# Patient Record
Sex: FEMALE | Race: WHITE | NOT HISPANIC OR LATINO | URBAN - METROPOLITAN AREA
[De-identification: names, ages, dates, MRNs, and addresses within clinical notes are randomized per-mention and may not be internally consistent; named-entity substitution may affect disease eponyms.]

---

## 2017-01-24 ENCOUNTER — GENERIC CONVERSION - ENCOUNTER (OUTPATIENT)
Dept: OTHER | Facility: OTHER | Age: 57
End: 2017-01-24

## 2017-01-24 DIAGNOSIS — E03.9 HYPOTHYROIDISM: ICD-10-CM

## 2018-01-22 VITALS
HEIGHT: 65 IN | BODY MASS INDEX: 48.48 KG/M2 | SYSTOLIC BLOOD PRESSURE: 118 MMHG | DIASTOLIC BLOOD PRESSURE: 72 MMHG | WEIGHT: 291 LBS

## 2023-05-30 ENCOUNTER — NURSING HOME VISIT (OUTPATIENT)
Dept: WOUND CARE | Facility: HOSPITAL | Age: 63
End: 2023-05-30

## 2023-05-30 DIAGNOSIS — Z87.19 HX OF VENTRAL HERNIA REPAIR: Primary | ICD-10-CM

## 2023-05-30 DIAGNOSIS — Z98.890 HX OF VENTRAL HERNIA REPAIR: Primary | ICD-10-CM

## 2023-05-30 NOTE — PROGRESS NOTES
[address] [facility]NAG[facility]   [facility]       Patient ID: Saurabh Villela is a 59 y o  female Date of Birth 1960     Location of Service: [address] [place] [address] 100 rehab    Performed wound round with: Wound team     Chief Complaint : Right abdomen    Wound Instructions:  Wound: Right abdomen wound  Discontinue current wound orders  Cleanse the wound bed with Dakin's quarter strength  Apply Skin-Prep to periwound area  Apply silver alginate to wound bed and cover with ABD pad  Daily and as needed for soiling  Continue to offload  Monitor for increased drainage and sign of infection, informed primary care in your facility    Allergies  Patient has no allergy information on record  Assessment & Plan:  1  Hx of ventral hernia repair  Assessment & Plan:  Recently admitted for abscess on the abdomen  The wound VAC on the mid abdomen is intact, was recently replaced yesterday  The stab wounds on the right abdomen is positive of malodor, with moderate amount of discharge  Change wound treatment to cleanse with Dakin's quarter strength and silver alginate  Continue to follow-up with surgeon  Follow-up in 2 weeks             Subjective:   May 20, 2023  New consult for wound on the abdomen  Patient was referred by Dr Brice Zimmer  Patient currently admitted at [address] [place] [address],[address] rehab for short-term rehab  As per review of Dr Rosendo Lundborg note on May 16, 2023, patient was recently admitted at [facility] for abscess from V1 to May 11, 2023  Patient have a history of perforated diverticulitis status post Molina's procedure in 2005, colostomy takedown and repair (stomal hernia was done on December 2005, incarcerated virginal hernia with repair with mesh and partial omentectomy on August 2011  She have a recurrent ventral hernia causing partial small bowel obstruction status post ex lap, JEANNINE, SBR , possible closure with bridging biologic mesh on March 2023    The recent CT scan showed abscess within the midline anterior abdomen that communicated with the ventral abdominal wound  Received patient in bed, seems comfortable  Wound VAC on the midline abdomen skin is intact  Patient denies pain  As per patient, she have a follow-up appointment with her surgeon next week  Review of Systems   Constitutional: Negative  Respiratory: Negative  Cardiovascular: Negative  Skin: Positive for wound  Psychiatric/Behavioral: Negative  Objective:    Physical Exam  Constitutional:       Appearance: She is obese  Cardiovascular:      Rate and Rhythm: Normal rate  Pulmonary:      Effort: Pulmonary effort is normal    Musculoskeletal:      Comments: LROM   Skin:     Findings: Lesion present  Comments: Right abdomen: Multiple wounds  From proximal to distal  1  Wound size is 0 3 x 0 5 x 0 1 cm ,  100% granulation, with small amount of serous drainage, positive malodor, no redness  2  Wound size is 0 6 x 1 x 0 1 cm ,  100% granulation, with small amount of serous drainage, positive malodor, no redness  3  Wound size is 0 5 x 1 x 0 1 cm ,  100% granulation, with small amount of serous drainage, positive malodor, no redness  4  Wound size is 1 x 0 8 x 0 1 cm ,  100% granulation, with moderate amount of tan drainage, positive malodor, no redness  5  Wound size is 1 5 x 3 x 0 1 cm ,  100% granulation, with moderate amount of tannish drainage, positive malodor, no redness  6  Wound size is 7 x 4 x 0 1 cm ,  50% granulation, 50% slough, with moderate amount of serous drainage, positive malodor, no redness   Neurological:      Mental Status: She is alert  Procedures           Patient's care was coordinated with nursing facility staff  Recent vitals, labs and updated medications were reviewed on EMR or chart system of facility   Past Medical, surgical, social, medication and allergy history and patient's previous records were reviewed and updated as appropriate: Most up-to date information "is available in the facility EMR where the patient is currently admitted  Allergy List:    Allergy Reaction Adverse Event Date  NKDA Unknown    Past Medical History:  Arthritis  Back pain  Chronic pain  Constipation  Depression  Thyroid disease in the past  Edema  History of hernia repair  Heat stroke  Hyperlipidemia  Hypertension  Kidney stone  Low back pain  Miscarriage  Obesity  Osteoarthritis  Perforated diverticulitis  Small bowel obstruction  Right thumb trigger finger  And unintentional weight loss    Past Surgical History:  Abdominal surgery for diverticulitis with colon resection and colostomy 2005 abscess drainage 2005 appendectomy 2005     Colonoscopy   Colostomy reversal   Dilation and curettage of uterus   Left tibia ORIF 2020  Laparotomy 2023  Laparotomy 2023  Sigmoidectomy   Ventral hernia repair 2011    Social History: Former smoker does not drink alcohol    Family History: Mother with diabetes, miscarriages, thyroid disorder ordered  Father with alcohol abuse and dementia  Maternal grandmother with arthritis and cancer  Sister with seizures    Immunization List:  Immunization ([address] Code) Date Given Consent Status  TB 2 Step Mantoux Skin Test (98) 2023 2:40:00 PM Consented  TB 2 Step Mantoux Skin Test (98) 2023 6:10:00 PM Consented  Influenza (141) 2022 Historical  Tdap (Tetanus, Diphtheria, Pertussis) (115) 2016 Historical       Coordination of Care: Wound team aware of the treatment plan  Facility nurse will provide wound treatment and monitor the wound for any changes  Patient / Staff education : Patient / Staff was given education on sign of infection and pressure ulcer prevention  Patient/ Staff verbalized understanding     Follow up : Two weeks    Voice-recognition software may have been used in the preparation of this document   Occasional wrong word or \"sound-alike\" substitutions may " have occurred due to the inherent limitations of voice recognition software  Interpretation should be guided by context        BAILEE Patino

## 2023-05-30 NOTE — ASSESSMENT & PLAN NOTE
Recently admitted for abscess on the abdomen  The wound VAC on the mid abdomen is intact, was recently replaced yesterday    The stab wounds on the right abdomen is positive of malodor, with moderate amount of discharge  Change wound treatment to cleanse with Dakin's quarter strength and silver alginate  Continue to follow-up with surgeon  Follow-up in 2 weeks

## 2023-06-13 ENCOUNTER — NURSING HOME VISIT (OUTPATIENT)
Dept: WOUND CARE | Facility: HOSPITAL | Age: 63
End: 2023-06-13

## 2023-06-13 DIAGNOSIS — Z87.19 HX OF VENTRAL HERNIA REPAIR: Primary | ICD-10-CM

## 2023-06-13 DIAGNOSIS — Z98.890 HX OF VENTRAL HERNIA REPAIR: Primary | ICD-10-CM

## 2023-06-13 NOTE — ASSESSMENT & PLAN NOTE
The wound on the left abdomen, which is currently on wound VAC, had moderate amount of bloody discharge  I ordered to hold the wound VAC and inform the surgeon  Patient have appointment with surgeon on Thursday  In the meantime, can use oral emulsion on an silver alginate for wound dressing  The wound on the right abdomen, the most superior wound, had moderate amount of purulent discharge  The rest of the wound had no obvious sign of infection  Can use silver alginate for wound dressing      Continue to follow-up with surgeon  Patient will be included in the weekly wound and will monitor for any worsening of the wound

## 2023-06-13 NOTE — PROGRESS NOTES
[address] [facility]   [facility]       Patient ID: Mayur Amaya is a 59 y o  female Date of Birth 1960     Location of Service: [address] [place] [address] 100 rehab    Performed wound round with: Wound team     Chief Complaint : Right abdomen    Wound Instructions:  Wound: Right abdomen wound except for wound covered with eschar  Discontinue current wound orders  Cleanse the wound bed with Dakin's quarter strength  Apply Skin-Prep to periwound area  Apply silver alginate to wound bed and cover with ABD pad  Daily and as needed for soiling    Wound: Left abdomen  Hold the wound VAC  Informed the surgeon  Cleanse the wound bed with Dakin's quarter strength  Apply Skin-Prep to periwound area  Apply oil emulsion to wound bed and cover with silver alginate and bordered foam or ABD pad  Daily and as needed for soiling    Continue to offload  Monitor for increased drainage and sign of infection, informed primary care in your facility    Allergies  Patient has no allergy information on record  Assessment & Plan:  1  Hx of ventral hernia repair  Assessment & Plan: The wound on the left abdomen, which is currently on wound VAC, had moderate amount of bloody discharge  I ordered to hold the wound VAC and inform the surgeon  Patient have appointment with surgeon on Thursday  In the meantime, can use oral emulsion on an silver alginate for wound dressing  The wound on the right abdomen, the most superior wound, had moderate amount of purulent discharge  The rest of the wound had no obvious sign of infection  Can use silver alginate for wound dressing  Continue to follow-up with surgeon  Patient will be included in the weekly wound and will monitor for any worsening of the wound             Subjective:   May 20, 2023  New consult for wound on the abdomen  Patient was referred by Dr Charlie Castro  Patient currently admitted at [address] [place] [address],[facility] 100 rehab for short-term rehab    As per review of Dr Joe Olmos Moustapha note on May 16, 2023, patient was recently admitted at [facility] for abscess from V1 to May 11, 2023  Patient have a history of perforated diverticulitis status post Molina's procedure in 2005, colostomy takedown and repair (stomal hernia was done on December 2005, incarcerated virginal hernia with repair with mesh and partial omentectomy on August 2011  She have a recurrent ventral hernia causing partial small bowel obstruction status post ex lap, JEANNINE, SBR , possible closure with bridging biologic mesh on March 2023  The recent CT scan showed abscess within the midline anterior abdomen that communicated with the ventral abdominal wound  Received patient in bed, seems comfortable  Wound VAC on the midline abdomen skin is intact  Patient denies pain  As per patient, she have a follow-up appointment with her surgeon next week  June 13, 2023  Follow-up for wound on the abdomen  As per report, patient was recently admitted in acute care for infection of the wound on the abdomen  As per report, patient completed the antibiotic  Received patient in bed, seems comfortable  Denies pain  Denies fever  I checked the medical record, there is no recent laboratory  Review of Systems   Constitutional: Negative  Respiratory: Negative  Cardiovascular: Negative  Skin: Positive for wound  Psychiatric/Behavioral: Negative  Objective:    Physical Exam  Constitutional:       Appearance: She is obese  Cardiovascular:      Rate and Rhythm: Normal rate  Pulmonary:      Effort: Pulmonary effort is normal    Musculoskeletal:      Comments: LROM   Skin:     Findings: Lesion present        Comments: Left abdomen: Wound size is 8 x 17 x 1 cm ,  100% granulation, with moderate amount of blood, periwound is normal, positive tenderness on palpation, with no obvious sign of infection    Buttocks: Wound size is 5 x 0 5 x 0 1 cm ,  100% epithelial, periwound is normal, with no obvious sign of infection    Wound on the left abdomen, from superior to inferior  1  Wound bed is 5 x 6 x 0 1 cm ,  100% granulation, with purulent discharge, periwound is normal, no malodor  2  With ostomy  3  With the ostomy  4  Wound bed is 2 x 3 x 0 1 cm ,  100% granulation, small amount of serous drainage, with no obvious sign of infection  5  Wound is resolved  6  Wound bed is 2 x 0 5 cm ,  100% eschar, no drainage, no obvious sign of infection   Neurological:      Mental Status: She is alert  Procedures             Patient's care was coordinated with nursing facility staff  Recent vitals, labs and updated medications were reviewed on EMR or chart system of facility  Past Medical, surgical, social, medication and allergy history and patient's previous records were reviewed and updated as appropriate: Most up-to date information is available in the facility EMR where the patient is currently admitted  Allergy List:    Allergy Reaction Adverse Event Date  NKDA Unknown    Past Medical History:  Arthritis  Back pain  Chronic pain  Constipation  Depression  Thyroid disease in the past  Edema  History of hernia repair  Heat stroke  Hyperlipidemia  Hypertension  Kidney stone  Low back pain  Miscarriage  Obesity  Osteoarthritis  Perforated diverticulitis  Small bowel obstruction  Right thumb trigger finger  And unintentional weight loss    Past Surgical History:  Abdominal surgery for diverticulitis with colon resection and colostomy 2005 abscess drainage 2005 appendectomy 2005     Colonoscopy   Colostomy reversal   Dilation and curettage of uterus   Left tibia ORIF 2020  Laparotomy 2023  Laparotomy 2023  Sigmoidectomy   Ventral hernia repair 2011    Social History: Former smoker does not drink alcohol    Family History:   Mother with diabetes, miscarriages, thyroid disorder ordered  Father with alcohol abuse and "dementia  Maternal grandmother with arthritis and cancer  Sister with seizures    Immunization List:  Immunization ([address] Code) Date Given Consent Status  TB 2 Step Mantoux Skin Test (98) 05/13/2023 2:40:00 PM Consented  TB 2 Step Mantoux Skin Test (98) 04/27/2023 6:10:00 PM Consented  Influenza (141) 11/08/2022 Historical  Tdap (Tetanus, Diphtheria, Pertussis) (115) 06/06/2016 Historical       Coordination of Care: Wound team aware of the treatment plan  Facility nurse will provide wound treatment and monitor the wound for any changes  Patient / Staff education : Patient / Staff was given education on sign of infection and pressure ulcer prevention  Patient/ Staff verbalized understanding     I have spent a total time of 20 minutes on 06/13/23 in caring for this patient including Risks and benefits of tx options, Instructions for management, Patient and family education, Importance of tx compliance, Risk factor reductions, Impressions, Counseling / Coordination of care, Documenting in the medical record, Obtaining or reviewing history   and Communicating with other healthcare professionals   Follow up :  Next week    Voice-recognition software may have been used in the preparation of this document  Occasional wrong word or \"sound-alike\" substitutions may have occurred due to the inherent limitations of voice recognition software  Interpretation should be guided by context        BAILEE Schaeffer  "

## 2023-07-11 ENCOUNTER — NURSING HOME VISIT (OUTPATIENT)
Dept: WOUND CARE | Facility: HOSPITAL | Age: 63
End: 2023-07-11
Payer: MEDICARE

## 2023-07-11 DIAGNOSIS — Z98.890 HX OF VENTRAL HERNIA REPAIR: Primary | ICD-10-CM

## 2023-07-11 DIAGNOSIS — Z87.19 HX OF VENTRAL HERNIA REPAIR: Primary | ICD-10-CM

## 2023-07-11 PROCEDURE — 99309 SBSQ NF CARE MODERATE MDM 30: CPT | Performed by: NURSE PRACTITIONER

## 2023-07-12 NOTE — PROGRESS NOTES
[facility]   AND HYPERBARIC MEDICINE [place]       Patient ID: Tamica Toussaint is a 59 y.o. female Date of Birth 1960     Location of Service: [address] [place], [place] [address]    Performed wound round with: Wound team     Chief Complaint : Right and left abdomen     Wound Instructions:  Wound: Right abdomen and left abdomen  Cleanse with normal saline solution or wound cleanser  Apply Skin-Prep to periwound area  Apply calcium alginate to wound bed and cover with ABD pad or bordered foam.  For the wound on the right abdomen, apply oil emulsion prior to application of calcium alginate. 3 times a day and as needed for soiling  Continue to offload  Monitor for increased drainage and sign of infection, informed primary care in your facility    Allergies  Patient has no allergy information on record. Assessment & Plan:  1. Hx of ventral hernia repair  Assessment & Plan:  Patient had appointment with surgeon today, and the surgery discontinue the wound VAC on the abdomen. The wound on the left abdomen is stable, 100% granulation  The patient had 2 active fistula on the right abdomen with brownish discharge. As per report, the ostomy, fistula appliance is not working, causing excoriation on. Patient also complained that she is not comfortable and she is on pain every time they are using the appliance. Local wound care with calcium alginate 3 times a day  Continue to monitor for worsening  This wound probably will not heal, the goal of care is conservative and management of drainage. As per surgeon, patient is not a candidate for surgery. Subjective:   May 20, 2023. New consult for wound on the abdomen. Patient was referred by Dr. Fuentes Coughlin. Patient currently admitted at [address] [place] [address] for short-term rehab. As per review of Dr. Leland Toro note on May 16, 2023, patient was recently admitted at [facility] for abscess from V1 to May 11, 2023.   Patient have a history of perforated diverticulitis status post Molina's procedure in 2005, colostomy takedown and repair (stomal hernia was done on December 2005, incarcerated virginal hernia with repair with mesh and partial omentectomy on August 2011. She have a recurrent ventral hernia causing partial small bowel obstruction status post ex lap, JEANNINE, SBR , possible closure with bridging biologic mesh on March 2023. The recent CT scan showed abscess within the midline anterior abdomen that communicated with the ventral abdominal wound. Received patient in bed, seems comfortable. Wound VAC on the midline abdomen skin is intact. Patient denies pain. As per patient, she have a follow-up appointment with her surgeon next week. June 13, 2023. Follow-up for wound on the abdomen. As per report, patient was recently admitted in acute care for infection of the wound on the abdomen. As per report, patient completed the antibiotic. Received patient in bed, seems comfortable. Denies pain. Denies fever. I checked the medical record, there is no recent laboratory. 7/11, 2023. Follow-up for wound on the abdomen. As per report, patient have appointment with surgeon today. Current wound treatment on the abdomen is wound VAC and ostomy appliance on the fistula. Patient complaining of pain on the abdomen on palpation. Review of Systems   Constitutional: Negative. Respiratory: Negative. Cardiovascular: Negative. Skin: Positive for wound. Psychiatric/Behavioral: Negative. Objective:    Physical Exam  Constitutional:       Appearance: She is obese. Cardiovascular:      Rate and Rhythm: Normal rate. Pulmonary:      Effort: Pulmonary effort is normal.   Musculoskeletal:      Comments: LROM   Skin:     Findings: Lesion present. Comments: Abdomen left:  Wound size is 5 x 16 x 0.3 cm.,  100% granulation, large amount of serous drainage, periwound is normal, with no obvious sign of infection    Right abdomen assessment from proximal to distal  Abdomen 1: Wound size is 6.5 x 3 x 0.1 cm.,  100% hypergranulation, large amount of serous drainage, periwound is normal, with no obvious sign of infection    Abdomen 2: Wound size is 0.5x1, depth none measurable, positive active fistula, draining brownish drainage, with no obvious sign of infection    Abdomen 3: Wound size is 0.3 x 0.3 cm.,  Depth not measurable, positive active fistula, draining brownish drainage, with no obvious sign of infection    Abdomen 4: Wound size is 0.6 x 1 cm.,  100% granulation, small amount of serous drainage, with no obvious sign of infection    Abdomen 5: Wound is resolved    Abdomen 6: Wound size is 2 x 3.5 cm.,  100% granulation, small amount of serous drainage, with no obvious sign of infection   Neurological:      Mental Status: She is alert. Procedures           Patient's care was coordinated with nursing facility staff. Recent vitals, labs and updated medications were reviewed on EMR or chart system of facility. Past Medical, surgical, social, medication and allergy history and patient's previous records were reviewed and updated as appropriate: Most up-to date information is available in the facility EMR where the patient is currently admitted. Allergy List:  .   Allergy Reaction Adverse Event Date  NKDA Unknown    Past Medical History:  Arthritis  Back pain  Chronic pain  Constipation  Depression  Thyroid disease in the past  Edema  History of hernia repair  Heat stroke  Hyperlipidemia  Hypertension  Kidney stone  Low back pain  Miscarriage  Obesity  Osteoarthritis  Perforated diverticulitis  Small bowel obstruction  Right thumb trigger finger  And unintentional weight loss    Past Surgical History:  Abdominal surgery for diverticulitis with colon resection and colostomy 2005 abscess drainage 2005 appendectomy 2005     Colonoscopy   Colostomy reversal   Dilation and curettage of uterus 1993  Left tibia ORIF July 2020  Laparotomy March 14, 2023  Laparotomy March 18, 2023  Sigmoidectomy 2005  Ventral hernia repair August 2011    Social History: Former smoker does not drink alcohol    Family History: Mother with diabetes, miscarriages, thyroid disorder ordered  Father with alcohol abuse and dementia  Maternal grandmother with arthritis and cancer  Sister with seizures    Immunization List:  Immunization ([address] Code) Date Given Consent Status  TB 2 Step Mantoux Skin Test (98) 05/13/2023 2:40:00 PM Consented  TB 2 Step Mantoux Skin Test (98) 04/27/2023 6:10:00 PM Consented  Influenza (141) 11/08/2022 Historical  Tdap (Tetanus, Diphtheria, Pertussis) (115) 06/06/2016 Historical       Coordination of Care: Wound team aware of the treatment plan. Facility nurse will provide wound treatment and monitor the wound for any changes. Patient / Staff education : Patient / Staff was given education on sign of infection and pressure ulcer prevention. Patient/ Staff verbalized understanding     I have spent a total time of 20 minutes on 7/11/2023 in caring for this patient including Risks and benefits of tx options, Instructions for management, Patient and family education, Importance of tx compliance, Risk factor reductions, Impressions, Counseling / Coordination of care, Documenting in the medical record, Obtaining or reviewing history   and Communicating with other healthcare professionals . Follow up :  Next week    Voice-recognition software may have been used in the preparation of this document. Occasional wrong word or "sound-alike" substitutions may have occurred due to the inherent limitations of voice recognition software. Interpretation should be guided by context.       BAILEE Milan

## 2023-07-12 NOTE — ASSESSMENT & PLAN NOTE
Patient had appointment with surgeon today, and the surgery discontinue the wound VAC on the abdomen. The wound on the left abdomen is stable, 100% granulation  The patient had 2 active fistula on the right abdomen with brownish discharge. As per report, the ostomy, fistula appliance is not working, causing excoriation on. Patient also complained that she is not comfortable and she is on pain every time they are using the appliance. Local wound care with calcium alginate 3 times a day  Continue to monitor for worsening  This wound probably will not heal, the goal of care is conservative and management of drainage. As per surgeon, patient is not a candidate for surgery.

## 2023-07-18 ENCOUNTER — NURSING HOME VISIT (OUTPATIENT)
Dept: WOUND CARE | Facility: HOSPITAL | Age: 63
End: 2023-07-18
Payer: MEDICARE

## 2023-07-18 DIAGNOSIS — Z87.19 HX OF VENTRAL HERNIA REPAIR: Primary | ICD-10-CM

## 2023-07-18 DIAGNOSIS — L98.8 FISTULA: ICD-10-CM

## 2023-07-18 DIAGNOSIS — Z98.890 HX OF VENTRAL HERNIA REPAIR: Primary | ICD-10-CM

## 2023-07-18 PROCEDURE — 99309 SBSQ NF CARE MODERATE MDM 30: CPT | Performed by: NURSE PRACTITIONER

## 2023-07-18 NOTE — ASSESSMENT & PLAN NOTE
Patient had 2 open fistula on the right abdomen with positive effluent, brown, moderate amount  I tried to use new ostomy appliance, will monitor

## 2023-07-18 NOTE — PROGRESS NOTES
[facility]   [facility] [place]       Patient ID: Lord Keating is a 59 y.o. female Date of Birth 1960     Location of Service: [address] [place], [place] [address]    Performed wound round with: Wound team     Chief Complaint : Right and left abdomen     Wound Instructions:  Wound: Right abdomen and left abdomen  Cleanse with normal saline solution or wound cleanser  Apply Skin-Prep to periwound area  Apply calcium alginate to wound bed and cover with ABD pad or bordered foam.  For the wound on the right abdomen, apply oil emulsion prior to application of calcium alginate. 3 times a day and as needed for soiling    Apply ostomy appliance to the fistula on the right abdomen    Continue to offload  Monitor for increased drainage and sign of infection, informed primary care in your facility    Allergies  Patient has no allergy information on record. Assessment & Plan:  1. Hx of ventral hernia repair  Assessment & Plan:  The surgical incision on the mid abdomen improved, decrease in wound size, and increased granulation. The 2 fistula on the right abdomen is still draining a lot. Calcium alginate is not working. I tried a new ostomy appliance. The other wound on the right abdomen is improving, increased granulation  Follow-up next week      2. Fistula  Assessment & Plan:  Patient had 2 open fistula on the right abdomen with positive effluent, brown, moderate amount  I tried to use new ostomy appliance, will monitor             Subjective:   May 20, 2023. New consult for wound on the abdomen. Patient was referred by Dr. Quiana Yoder. Patient currently admitted at [address] [place] [address] for short-term rehab. As per review of Dr. Trinh Don note on May 16, 2023, patient was recently admitted at [facility] for abscess from V1 to May 11, 2023.   Patient have a history of perforated diverticulitis status post Molina's procedure in 2005, colostomy takedown and repair (stomal hernia was done on December 2005, incarcerated virginal hernia with repair with mesh and partial omentectomy on August 2011. She have a recurrent ventral hernia causing partial small bowel obstruction status post ex lap, JEANNINE, SBR , possible closure with bridging biologic mesh on March 2023. The recent CT scan showed abscess within the midline anterior abdomen that communicated with the ventral abdominal wound. Received patient in bed, seems comfortable. Wound VAC on the midline abdomen skin is intact. Patient denies pain. As per patient, she have a follow-up appointment with her surgeon next week. June 13, 2023. Follow-up for wound on the abdomen. As per report, patient was recently admitted in acute care for infection of the wound on the abdomen. As per report, patient completed the antibiotic. Received patient in bed, seems comfortable. Denies pain. Denies fever. I checked the medical record, there is no recent laboratory. 7/11, 2023. Follow-up for wound on the abdomen. As per report, patient have appointment with surgeon today. Current wound treatment on the abdomen is wound VAC and ostomy appliance on the fistula. Patient complaining of pain on the abdomen on palpation. July 18, 2023. Follow-up for wound on the abdomen. Is a patient in bed, seems comfortable. As per report, patient in pain refusing calcium alginate on the right abdomen and was only requesting for a dry sterile dressing. Fistula is still draining a lot. Review of Systems   Constitutional: Negative. Respiratory: Negative. Cardiovascular: Negative. Skin: Positive for wound. Psychiatric/Behavioral: Negative. Objective:    Physical Exam  Constitutional:       Appearance: She is obese. Cardiovascular:      Rate and Rhythm: Normal rate. Pulmonary:      Effort: Pulmonary effort is normal.   Musculoskeletal:      Comments: LROM   Skin:     Findings: Lesion present.       Comments: Abdomen: Surgical wound is 5 x 16 x 0.2 cm.,  100% granulation, moderate amount of serous drainage, periwound is normal, with no obvious sign of infection    Right abdomen  Wound #1: Wound size is 5.5 x 2.5 x 0.1 cm.,  100% granulation, moderate amount of serous drainage, with no obvious sign of infection    Wound #2: Wound size is 0.6 x 0.6 cm.,  Moderate stool drainage, with no obvious sign of infection    Wound #3: Wound size is 0.2 x 0.2 cm.,  Moderate stool drainage, with no obvious sign of infection    Wound #4: Wound size is 0.5 x 0.5 cm.,  100% epithelial, with no obvious sign of infection     wound #6: Wound size is 2 x 3 x 0.1 cm.,  100% granulation, small amount of serous drainage, with no obvious sign of infection, periwound is excoriated   Neurological:      Mental Status: She is alert. Procedures           Patient's care was coordinated with nursing facility staff. Recent vitals, labs and updated medications were reviewed on EMR or chart system of facility. Past Medical, surgical, social, medication and allergy history and patient's previous records were reviewed and updated as appropriate: Most up-to date information is available in the facility EMR where the patient is currently admitted. Allergy List:  .   Allergy Reaction Adverse Event Date  NKDA Unknown    Past Medical History:  Arthritis  Back pain  Chronic pain  Constipation  Depression  Thyroid disease in the past  Edema  History of hernia repair  Heat stroke  Hyperlipidemia  Hypertension  Kidney stone  Low back pain  Miscarriage  Obesity  Osteoarthritis  Perforated diverticulitis  Small bowel obstruction  Right thumb trigger finger  And unintentional weight loss    Past Surgical History:  Abdominal surgery for diverticulitis with colon resection and colostomy 2005 abscess drainage 2005 appendectomy 2005     Colonoscopy   Colostomy reversal   Dilation and curettage of uterus   Left tibia ORIF 2020  Laparotomy March 14, 2023  Laparotomy March 18, 2023  Sigmoidectomy 2005  Ventral hernia repair August 2011    Social History: Former smoker does not drink alcohol    Family History: Mother with diabetes, miscarriages, thyroid disorder ordered  Father with alcohol abuse and dementia  Maternal grandmother with arthritis and cancer  Sister with seizures    Immunization List:  Immunization ([address] Code) Date Given Consent Status  TB 2 Step Mantoux Skin Test (98) 05/13/2023 2:40:00 PM Consented  TB 2 Step Mantoux Skin Test (98) 04/27/2023 6:10:00 PM Consented  Influenza (141) 11/08/2022 Historical  Tdap (Tetanus, Diphtheria, Pertussis) (115) 06/06/2016 Historical       Coordination of Care: Wound team aware of the treatment plan. Facility nurse will provide wound treatment and monitor the wound for any changes. Patient / Staff education : Patient / Staff was given education on sign of infection and pressure ulcer prevention. Patient/ Staff verbalized understanding     I have spent a total time of 25 minutes on 7/11/2023 in caring for this patient including Risks and benefits of tx options, Instructions for management, Patient and family education, Importance of tx compliance, Risk factor reductions, Impressions, Counseling / Coordination of care, Documenting in the medical record, Obtaining or reviewing history   and Communicating with other healthcare professionals . Follow up :  Next week    Voice-recognition software may have been used in the preparation of this document. Occasional wrong word or "sound-alike" substitutions may have occurred due to the inherent limitations of voice recognition software. Interpretation should be guided by context.       BAILEE Fortune

## 2023-07-18 NOTE — ASSESSMENT & PLAN NOTE
The surgical incision on the mid abdomen improved, decrease in wound size, and increased granulation. The 2 fistula on the right abdomen is still draining a lot. Calcium alginate is not working. I tried a new ostomy appliance.    The other wound on the right abdomen is improving, increased granulation  Follow-up next week

## 2023-07-19 ENCOUNTER — HOSPITAL ENCOUNTER (EMERGENCY)
Facility: HOSPITAL | Age: 63
Discharge: HOME | End: 2023-07-20
Attending: EMERGENCY MEDICINE
Payer: MEDICARE

## 2023-07-19 ENCOUNTER — APPOINTMENT (EMERGENCY)
Dept: RADIOLOGY | Facility: HOSPITAL | Age: 63
End: 2023-07-19
Payer: MEDICARE

## 2023-07-19 DIAGNOSIS — Z87.19 HX OF VENTRAL HERNIA REPAIR: ICD-10-CM

## 2023-07-19 DIAGNOSIS — Z98.890 HX OF VENTRAL HERNIA REPAIR: ICD-10-CM

## 2023-07-19 DIAGNOSIS — L98.8 FISTULA: Primary | ICD-10-CM

## 2023-07-19 LAB
ALBUMIN SERPL BCP-MCNC: 1.4 G/DL (ref 3.5–5)
ALP SERPL-CCNC: 186 U/L (ref 46–116)
ALT SERPL W P-5'-P-CCNC: 16 U/L (ref 12–78)
ANION GAP SERPL CALCULATED.3IONS-SCNC: 8 MMOL/L
AST SERPL W P-5'-P-CCNC: 20 U/L (ref 5–45)
BASOPHILS # BLD AUTO: 0.02 THOUSANDS/ÂΜL (ref 0–0.1)
BASOPHILS NFR BLD AUTO: 0 % (ref 0–1)
BILIRUB SERPL-MCNC: 0.33 MG/DL (ref 0.2–1)
BUN SERPL-MCNC: 14 MG/DL (ref 5–25)
CALCIUM ALBUM COR SERPL-MCNC: 10.4 MG/DL (ref 8.3–10.1)
CALCIUM SERPL-MCNC: 8.3 MG/DL (ref 8.3–10.1)
CHLORIDE SERPL-SCNC: 109 MMOL/L (ref 96–108)
CO2 SERPL-SCNC: 24 MMOL/L (ref 21–32)
CREAT SERPL-MCNC: 0.62 MG/DL (ref 0.6–1.3)
EOSINOPHIL # BLD AUTO: 0.05 THOUSAND/ÂΜL (ref 0–0.61)
EOSINOPHIL NFR BLD AUTO: 1 % (ref 0–6)
ERYTHROCYTE [DISTWIDTH] IN BLOOD BY AUTOMATED COUNT: 21.2 % (ref 11.6–15.1)
GFR SERPL CREATININE-BSD FRML MDRD: 96 ML/MIN/1.73SQ M
GLUCOSE SERPL-MCNC: 109 MG/DL (ref 65–140)
HCT VFR BLD AUTO: 36 % (ref 34.8–46.1)
HGB BLD-MCNC: 11 G/DL (ref 11.5–15.4)
IMM GRANULOCYTES # BLD AUTO: 0.02 THOUSAND/UL (ref 0–0.2)
IMM GRANULOCYTES NFR BLD AUTO: 0 % (ref 0–2)
LACTATE SERPL-SCNC: 1.6 MMOL/L (ref 0.5–2)
LYMPHOCYTES # BLD AUTO: 2.73 THOUSANDS/ÂΜL (ref 0.6–4.47)
LYMPHOCYTES NFR BLD AUTO: 37 % (ref 14–44)
MCH RBC QN AUTO: 27.8 PG (ref 26.8–34.3)
MCHC RBC AUTO-ENTMCNC: 30.6 G/DL (ref 31.4–37.4)
MCV RBC AUTO: 91 FL (ref 82–98)
MONOCYTES # BLD AUTO: 0.85 THOUSAND/ÂΜL (ref 0.17–1.22)
MONOCYTES NFR BLD AUTO: 12 % (ref 4–12)
NEUTROPHILS # BLD AUTO: 3.65 THOUSANDS/ÂΜL (ref 1.85–7.62)
NEUTS SEG NFR BLD AUTO: 50 % (ref 43–75)
NRBC BLD AUTO-RTO: 0 /100 WBCS
PLATELET # BLD AUTO: 332 THOUSANDS/UL (ref 149–390)
PMV BLD AUTO: 8.7 FL (ref 8.9–12.7)
POTASSIUM SERPL-SCNC: 3.8 MMOL/L (ref 3.5–5.3)
PROT SERPL-MCNC: 5.4 G/DL (ref 6.4–8.4)
RBC # BLD AUTO: 3.96 MILLION/UL (ref 3.81–5.12)
SODIUM SERPL-SCNC: 141 MMOL/L (ref 135–147)
WBC # BLD AUTO: 7.32 THOUSAND/UL (ref 4.31–10.16)

## 2023-07-19 PROCEDURE — 83605 ASSAY OF LACTIC ACID: CPT

## 2023-07-19 PROCEDURE — 85025 COMPLETE CBC W/AUTO DIFF WBC: CPT

## 2023-07-19 PROCEDURE — 36415 COLL VENOUS BLD VENIPUNCTURE: CPT

## 2023-07-19 PROCEDURE — 80053 COMPREHEN METABOLIC PANEL: CPT

## 2023-07-19 PROCEDURE — 93005 ELECTROCARDIOGRAM TRACING: CPT

## 2023-07-19 PROCEDURE — G1004 CDSM NDSC: HCPCS

## 2023-07-19 PROCEDURE — 74177 CT ABD & PELVIS W/CONTRAST: CPT

## 2023-07-19 RX ORDER — ONDANSETRON 2 MG/ML
4 INJECTION INTRAMUSCULAR; INTRAVENOUS ONCE
Status: COMPLETED | OUTPATIENT
Start: 2023-07-19 | End: 2023-07-19

## 2023-07-19 RX ORDER — SODIUM CHLORIDE, SODIUM GLUCONATE, SODIUM ACETATE, POTASSIUM CHLORIDE, MAGNESIUM CHLORIDE, SODIUM PHOSPHATE, DIBASIC, AND POTASSIUM PHOSPHATE .53; .5; .37; .037; .03; .012; .00082 G/100ML; G/100ML; G/100ML; G/100ML; G/100ML; G/100ML; G/100ML
1000 INJECTION, SOLUTION INTRAVENOUS ONCE
Status: COMPLETED | OUTPATIENT
Start: 2023-07-19 | End: 2023-07-19

## 2023-07-19 RX ORDER — HYDROMORPHONE HCL IN WATER/PF 6 MG/30 ML
0.2 PATIENT CONTROLLED ANALGESIA SYRINGE INTRAVENOUS ONCE
Status: COMPLETED | OUTPATIENT
Start: 2023-07-19 | End: 2023-07-19

## 2023-07-19 RX ADMIN — SODIUM CHLORIDE, SODIUM GLUCONATE, SODIUM ACETATE, POTASSIUM CHLORIDE, MAGNESIUM CHLORIDE, SODIUM PHOSPHATE, DIBASIC, AND POTASSIUM PHOSPHATE 1000 ML: .53; .5; .37; .037; .03; .012; .00082 INJECTION, SOLUTION INTRAVENOUS at 19:58

## 2023-07-19 RX ADMIN — ONDANSETRON 4 MG: 2 INJECTION INTRAMUSCULAR; INTRAVENOUS at 19:58

## 2023-07-19 RX ADMIN — IOHEXOL 95 ML: 350 INJECTION, SOLUTION INTRAVENOUS at 21:01

## 2023-07-19 RX ADMIN — HYDROMORPHONE HYDROCHLORIDE 0.2 MG: 0.2 INJECTION, SOLUTION INTRAMUSCULAR; INTRAVENOUS; SUBCUTANEOUS at 22:24

## 2023-07-19 NOTE — ED PROVIDER NOTES
History  Chief Complaint   Patient presents with   • Post-op Problem     Pt has had multiple abdominal surgeries in march to fix hernias and bowel obstructions; pt has had problem since w/ necrosis of her colostomy. Pt presents today w/ multiple abscesses on her right side with fistulas that leak purulent discharge and feces     62-year-old female past medical history of numerous abdominal surgeries complicated by incarcerated ventral hernias and recent numerous enteric cutaneous fistula formation presents to the ED complaining of increased drainage from numerous abdominal wall fistulas, pain and irritation to the right side of her abdomen. Patient's primary surgical care team is at [facility]. Since March of this year, patient has undergone numerous surgical procedures for a which became incarcerated and cause numerous bowel obstructions. 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 She was initially being managed with a wound VAC to the blind surgical incision site. This has recently been removed and has been contained with wet-to-dry dressings. For the enterocutaneous fistulas, she initially was applying ostomy bags for flexion but has recently refused these secondary to the irritation they were causing to her surrounding abdomen. Patient presents today because she feels as though there is more drainage stool from her abdomen and feels as though more areas of the fistula are opening up. Patient otherwise endorses decreased p.o. intake and nausea but no vomiting. No fevers that she recalls. She is not currently on antibiotics. Patient has been told by her primary surgical team that he is no longer surgical candidate for these fistulae and wounds and they will either heal or they want.   Patient and her outpatient rehab team feel that would like a second opinion as they have noted increased drainage over the past few days and feels other current management is unacceptable. None       Past Medical History:   Diagnosis Date   • Cognitive communication deficit    • Constipation    • Dysphagia    • Essential hypertension    • Fistula of intestine    • GERD (gastroesophageal reflux disease)    • Hyperlipidemia    • Hypothyroidism    • Hypovolemia    • Insomnia    • Obesity    • Osteoarthritis    • Partial obstruction of small intestine (HCC)    • Peritoneal abscess (HCC)    • Protein calorie malnutrition (HCC)    • UTI (urinary tract infection)        Past Surgical History:   Procedure Laterality Date   • ABDOMINAL SURGERY     • COLOSTOMY         History reviewed. No pertinent family history. I have reviewed and agree with the history as documented. E-Cigarette/Vaping     E-Cigarette/Vaping Substances     Social History     Tobacco Use   • Smoking status: Never   • Smokeless tobacco: Never   Substance Use Topics   • Alcohol use: Not Currently   • Drug use: Never        Review of Systems   Constitutional: Negative for chills and fever. HENT: Negative for ear pain and sore throat. Eyes: Negative for pain and visual disturbance. Respiratory: Negative for cough and shortness of breath. Cardiovascular: Negative for chest pain and palpitations. Gastrointestinal: Positive for abdominal distention. Negative for abdominal pain and vomiting. Genitourinary: Negative for dysuria and hematuria. Musculoskeletal: Negative for arthralgias and back pain. Skin: Positive for wound. Negative for color change and rash. Neurological: Negative for seizures and syncope. All other systems reviewed and are negative.       Physical Exam  ED Triage Vitals   Temperature Pulse Respirations Blood Pressure SpO2   07/19/23 1926 07/19/23 1926 07/19/23 1926 07/19/23 1926 07/19/23 1926   98.2 °F (36.8 °C) (!) 106 18 119/58 97 %      Temp Source Heart Rate Source Patient Position - Orthostatic VS BP Location FiO2 (%)   07/19/23 1926 07/19/23 1926 07/19/23 1926 07/19/23 1926 --   Oral Monitor Lying Left arm       Pain Score       07/19/23 2224       8             Orthostatic Vital Signs  Vitals:    07/20/23 0230 07/20/23 0400 07/20/23 0430 07/20/23 0530   BP: 103/51 130/60 131/60 121/57   Pulse: (!) 110 (!) 106 (!) 110 (!) 109   Patient Position - Orthostatic VS: Lying Lying Lying Lying       Physical Exam  Vitals and nursing note reviewed. Constitutional:       General: She is not in acute distress. Appearance: She is well-developed. She is obese. HENT:      Head: Normocephalic and atraumatic. Eyes:      Conjunctiva/sclera: Conjunctivae normal.   Cardiovascular:      Rate and Rhythm: Normal rate and regular rhythm. Heart sounds: No murmur heard. Pulmonary:      Effort: Pulmonary effort is normal. No respiratory distress. Breath sounds: Normal breath sounds. Abdominal:      Palpations: Abdomen is soft. Tenderness: There is no abdominal tenderness. Comments: Large surgical midline incision, currently dressed with wet-to-dry dressing. On inspection, appears to have healthy granulation tissue. No purulence or feculent drainage. Numerous enterocutaneous fistulous visible to the right lower quadrant abdominal wall. Red erythematous and te irritated skin ending fistula. No signs of active infection. Edematous right lower abdominal pannus. Musculoskeletal:         General: No swelling. Cervical back: Neck supple. Skin:     General: Skin is warm and dry. Capillary Refill: Capillary refill takes less than 2 seconds. Neurological:      Mental Status: She is alert.    Psychiatric:         Mood and Affect: Mood normal.         ED Medications  Medications   multi-electrolyte (ISOLYTE-S PH 7.4) bolus 1,000 mL (0 mL Intravenous Stopped 7/19/23 2202)   ondansetron (ZOFRAN) injection 4 mg (4 mg Intravenous Given 7/19/23 1958)   iohexol (OMNIPAQUE) 350 MG/ML injection (MULTI-DOSE) 100 mL (95 mL Intravenous Given 7/19/23 2101) HYDROmorphone HCl (DILAUDID) injection 0.2 mg (0.2 mg Intravenous Given 7/19/23 2224)   fentanyl citrate (PF) 100 MCG/2ML 50 mcg (50 mcg Intravenous Given 7/20/23 0148)   HYDROmorphone (DILAUDID) injection 0.5 mg (0.5 mg Intravenous Given 7/20/23 0520)       Diagnostic Studies  Results Reviewed     Procedure Component Value Units Date/Time    Lactic acid, plasma (w/reflex if result > 2.0) [[id number]]  (Normal) Collected: 07/19/23 1958    Lab Status: Final result Specimen: Blood from Arm, Right Updated: 07/19/23 2057     LACTIC ACID 1.6 mmol/L     Narrative:      Result may be elevated if tourniquet was used during collection.     Comprehensive metabolic panel [[id number]]  (Abnormal) Collected: 07/19/23 1958    Lab Status: Final result Specimen: Blood from Arm, Right Updated: 07/19/23 2054     Sodium 141 mmol/L      Potassium 3.8 mmol/L      Chloride 109 mmol/L      CO2 24 mmol/L      ANION GAP 8 mmol/L      BUN 14 mg/dL      Creatinine 0.62 mg/dL      Glucose 109 mg/dL      Calcium 8.3 mg/dL      Corrected Calcium 10.4 mg/dL      AST 20 U/L      ALT 16 U/L      Alkaline Phosphatase 186 U/L      Total Protein 5.4 g/dL      Albumin 1.4 g/dL      Total Bilirubin 0.33 mg/dL      eGFR 96 ml/min/1.73sq m     Narrative:      [facility] guidelines for Chronic Kidney Disease (CKD):   •  Stage 1 with normal or high GFR (GFR > 90 mL/min/1.73 square meters)  •  Stage 2 Mild CKD (GFR = 60-89 mL/min/1.73 square meters)  •  Stage 3A Moderate CKD (GFR = 45-59 mL/min/1.73 square meters)  •  Stage 3B Moderate CKD (GFR = 30-44 mL/min/1.73 square meters)  •  Stage 4 Severe CKD (GFR = 15-29 mL/min/1.73 square meters)  •  Stage 5 End Stage CKD (GFR <15 mL/min/1.73 square meters)  Note: GFR calculation is accurate only with a steady state creatinine    CBC and differential [[id number]]  (Abnormal) Collected: 07/19/23 1958    Lab Status: Final result Specimen: Blood from Arm, Right Updated: 07/19/23 2021     WBC 7.32 Thousand/uL      RBC 3.96 Million/uL      Hemoglobin 11.0 g/dL      Hematocrit 36.0 %      MCV 91 fL      MCH 27.8 pg      MCHC 30.6 g/dL      RDW 21.2 %      MPV 8.7 fL      Platelets 898 Thousands/uL      nRBC 0 /100 WBCs      Neutrophils Relative 50 %      Immat GRANS % 0 %      Lymphocytes Relative 37 %      Monocytes Relative 12 %      Eosinophils Relative 1 %      Basophils Relative 0 %      Neutrophils Absolute 3.65 Thousands/µL      Immature Grans Absolute 0.02 Thousand/uL      Lymphocytes Absolute 2.73 Thousands/µL      Monocytes Absolute 0.85 Thousand/µL      Eosinophils Absolute 0.05 Thousand/µL      Basophils Absolute 0.02 Thousands/µL                  CT abdomen pelvis with contrast   Final Result by Terry Nicole MD (07/20 0940)      Overall no significant interval change since prior examination. Tethering of bowel loops within the right lower quadrant with regional inflammatory change again consistent with the clinical history of known enteroenteric fistulas. Bowel loops also abut the cutaneous surface raising suspicion for enterocutaneous    fistula formation. No evidence for bowel obstruction. No discrete rim-enhancing collections identified. Remainder of the findings, as described above. Workstation performed: [id number]               Procedures  Procedures      ED Course  ED Course as of 07/20/23 1306   Wed Jul 19, 2023 2006 Surgery aware of patient, if new and acute findings, call surgery, if nothing acute, outpatient evaluation. SBIRT 22yo+    Flowsheet Row Most Recent Value   Initial Alcohol Screen: US AUDIT-C     1. How often do you have a drink containing alcohol? 0 Filed at: 07/19/2023 1929   2. How many drinks containing alcohol do you have on a typical day you are drinking? 0 Filed at: 07/19/2023 1929   3b. FEMALE Any Age, or MALE 65+: How often do you have 4 or more drinks on one occassion?  0 Filed at: 07/19/2023 1929   Audit-C Score 0 Filed at: 07/19/2023 1929   LOUIS: How many times in the past year have you. .. Used an illegal drug or used a prescription medication for non-medical reasons? Never Filed at: 07/19/2023 [facility] Making  80-year-old female past medical history of numerous abdominal surgeries complicated by incarcerated ventral hernias and recent numerous enteric cutaneous fistula formation presents to the ED complaining of increased drainage from numerous abdominal wall fistulas, pain and irritation to the right side of her abdomen. DDx: Surface abdominal cellulitis, known enteroenteric fistulas, enterocutaneous fistula is also known, new abdominal wall versus intra-abdominal abscess. Patient primary complaint is that she feels more drainage from her known enteric cutaneous fistulas. She has been following with an outside hospital surgical team and has been told that she is no longer surgical candidate for any kind of maintenance or correction. She is here primarily for a second opinion. No acute emergent complaints of fever, abdominal pain chills or signs of systemic illness. Will obtain labs and CT scan to evaluate for underlying bowel obstruction versus acute abscess. Discussed case with surgery who states that if no acute emergency surgical pathology discovered, patient is a candidate for outpatient consultation as opposed to inpatient consultation. Discussed this recommendation with patient and she is understanding and agreeable. Patient signed out in stable condition at this time to Dr. Jacy Waddell awaiting results of CT scan. Amount and/or Complexity of Data Reviewed  Labs: ordered. Radiology: ordered. Risk  Prescription drug management.             Disposition  Final diagnoses:   Fistula   Hx of ventral hernia repair     Time reflects when diagnosis was documented in both MDM as applicable and the Disposition within this note     Time User Action Codes Description Comment    7/19/2023  8:12 PM Barbara Garcia Add [L98.8] Fistula     7/19/2023  8:12 PM Barbara Garcia Add [O51.590,  Z87.19] Hx of ventral hernia repair       ED Disposition     ED Disposition   Discharge    Condition   Stable    Date/Time   Thu Jul 20, 2023  1:28 AM    Comment   Safia Lynneing discharge to home/self care. Follow-up Information     Follow up With Specialties Details Why Contact Info Additional [address] General Surgery Schedule an appointment as soon as possible for a visit   [address] [facility] [address] [facility] [address]  [address] [place] [address], [address] [place] [address], [address] [place] [address] Emergency Department Emergency Medicine Go to  If symptoms worsen, As needed [address] [id number]  [facility], [address] [place], [place], [facility]          There are no discharge medications for this patient. PDMP Review     None           ED Provider  Attending physically available and evaluated Safia Flowers. I managed the patient along with the ED Attending.     Electronically Signed by         Barbara Garcia MD  07/20/23 1404

## 2023-07-19 NOTE — ED ATTENDING ATTESTATION
Maurice Browne MD, saw and evaluated the patient. I have discussed the patient with the resident and agree with the resident's findings, Plan of Care, and MDM as documented in the resident's note, except where noted. All available labs and Radiology studies were reviewed. I was present for key portions of any procedure(s) performed by the resident and I was immediately available to provide assistance. At this point I agree with the current assessment done in the Emergency Department. I have conducted an independent evaluation of this patient a history and physical is as follows:    58 yo morbidly obese female with a complicated past medical history including multiple abdominal surgeries, prior SBO, multiple GI fistulas, HTN, hypothyroidism, and hyperlipidemia presents to the ED complaining of multiple open fistulas leaking fecal material from her right abdomen. The patient is followed by Surgery at [facility]. She says that she was told "they couldn't do anything for me" so she would like to get a second opinion at [facility]. No fevers or chills. (+) Baseline abdominal discomfort. (+) Some nausea but no vomiting. The patients says he abdomen "feels heavier" than usual. No other specific complaints. ROS: per resident physician note    Gen: NAD, AA&Ox3  HEENT: PERRL, EOMI  Neck: supple  CV: (+) tachycardia  Lungs: CTA B/L  Abdomen: soft, (+) multiple open fistulas to right abdomen draining fecal material, (+) edema, (+) mild right sided erythema  Ext: no swelling or deformity  Neuro: 5/5 strength all extremities, sensation grossly intact  Skin: no rash    ED Course  The patient is comfortable appearing with stable vital signs other than a mild tachycardia. (+) Multiple open fistulas visualized. Complaints do not seem acute and she admits that she has been following with an outside surgeon for these exact issues. Possible abscess formation or new fluid collection?  Will check basic labs, lactate, and CT A/P. IVFs and Zofran administered, will continue to monitor in the ED. Plan to discuss case with General Surgery. Disposition per workup, reassessment, and consultant recommendations. 00:00 Care signed out to overnight team with CT read and reassessment pending. ..     Critical Care Time  Procedures

## 2023-07-20 VITALS
DIASTOLIC BLOOD PRESSURE: 57 MMHG | HEART RATE: 109 BPM | OXYGEN SATURATION: 95 % | RESPIRATION RATE: 17 BRPM | SYSTOLIC BLOOD PRESSURE: 121 MMHG | TEMPERATURE: 98.2 F

## 2023-07-20 RX ORDER — FENTANYL CITRATE 50 UG/ML
50 INJECTION, SOLUTION INTRAMUSCULAR; INTRAVENOUS ONCE
Status: COMPLETED | OUTPATIENT
Start: 2023-07-20 | End: 2023-07-20

## 2023-07-20 RX ORDER — HYDROMORPHONE HCL/PF 1 MG/ML
0.5 SYRINGE (ML) INJECTION ONCE
Status: COMPLETED | OUTPATIENT
Start: 2023-07-20 | End: 2023-07-20

## 2023-07-20 RX ADMIN — FENTANYL CITRATE 50 MCG: 50 INJECTION INTRAMUSCULAR; INTRAVENOUS at 01:48

## 2023-07-20 RX ADMIN — HYDROMORPHONE HYDROCHLORIDE 0.5 MG: 1 INJECTION, SOLUTION INTRAMUSCULAR; INTRAVENOUS; SUBCUTANEOUS at 05:20

## 2023-07-20 NOTE — ED NOTES
Pt taken in stable condition by [facility] RN to GI lab for colonoscopy. [facility] called to inform them that patient is discharged and will be arriving back to their facility. No answer, so voice message left.      Krystin Gonzalez RN  07/20/23 3305

## 2023-07-20 NOTE — DISCHARGE INSTRUCTIONS
On CT scan, some of your wounds seem to be getting worse but without any evidence of acute infection or systemic illness that requires immediate intervention. You need to followup with surgery and continue seeing wound care. If you are having fevers, severe worsening of your symptoms or new concerns then please come back for evaluation.

## 2023-07-22 LAB
ATRIAL RATE: 98 BPM
P AXIS: 35 DEGREES
PR INTERVAL: 176 MS
QRS AXIS: -47 DEGREES
QRSD INTERVAL: 92 MS
QT INTERVAL: 358 MS
QTC INTERVAL: 457 MS
T WAVE AXIS: 36 DEGREES
VENTRICULAR RATE: 98 BPM

## 2023-07-22 PROCEDURE — 93010 ELECTROCARDIOGRAM REPORT: CPT | Performed by: INTERNAL MEDICINE

## 2023-07-25 ENCOUNTER — NURSING HOME VISIT (OUTPATIENT)
Dept: WOUND CARE | Facility: HOSPITAL | Age: 63
End: 2023-07-25
Payer: MEDICARE

## 2023-07-25 DIAGNOSIS — L98.8 FISTULA: ICD-10-CM

## 2023-07-25 DIAGNOSIS — Z98.890 HX OF VENTRAL HERNIA REPAIR: Primary | ICD-10-CM

## 2023-07-25 DIAGNOSIS — Z87.19 HX OF VENTRAL HERNIA REPAIR: Primary | ICD-10-CM

## 2023-07-25 PROCEDURE — 99308 SBSQ NF CARE LOW MDM 20: CPT | Performed by: NURSE PRACTITIONER

## 2023-07-25 NOTE — ASSESSMENT & PLAN NOTE
Fistula on the right abdomen  All the for his fistula is draining with stool, as per report, have some purulent discharge during weekend. Staff to manage the fistula, current local wound care is calcium alginate.   Patient can also use a small ostomy bag to manage the drainage  I ordered Z guard for the periwound area due to excoriation  Follow-up next week

## 2023-07-25 NOTE — PROGRESS NOTES
[facility]       Patient ID: Nikki Edward is a 59 y.o. female Date of Birth 1960     Location of Service: [address] [place], [place] [address]    Performed wound round with: Wound team     Chief Complaint : Right and left abdomen     Wound Instructions:  Wound: Left abdomen  Cleanse with normal saline solution or wound cleanser  Apply Skin-Prep to periwound area  Apply collagen to wound bed and cover with ABD and Curlex  Daily and as needed for soiling    Wound: Right abdomen including the fistula  Cleanse with normal saline solution wound cleanser  Apply Z guard to periwound area  Apply calcium alginate to wound bed and cover with ABD  Daily and as needed for soiling    Can Apply ostomy appliance to the fistula on the right abdomen for moderate -large drainage    Continue to offload  Monitor for increased drainage and sign of infection, informed primary care in your facility    Allergies  Oxycodone      Assessment & Plan:  1. Hx of ventral hernia repair  Assessment & Plan:  Abdomen  Wound improved compared to last week, increase granulation and decrease in wound size  Change local wound care to collagen  Patient have appointment with general surgery at [facility]  Follow-up next week      2. Fistula  Assessment & Plan:  Fistula on the right abdomen  All the for his fistula is draining with stool, as per report, have some purulent discharge during weekend. Staff to manage the fistula, current local wound care is calcium alginate. Patient can also use a small ostomy bag to manage the drainage  I ordered Z guard for the periwound area due to excoriation  Follow-up next week               Subjective:   May 20, 2023. New consult for wound on the abdomen. Patient was referred by Dr. Mame Viera. Patient currently admitted at [address] [place] [address] for short-term rehab.   As per review of Dr. Estelita Fuentes note on May 16, 2023, patient was recently admitted at [facility] for abscess from V1 to May 11, 2023. Patient have a history of perforated diverticulitis status post Molina's procedure in 2005, colostomy takedown and repair (stomal hernia was done on December 2005, incarcerated virginal hernia with repair with mesh and partial omentectomy on August 2011. She have a recurrent ventral hernia causing partial small bowel obstruction status post ex lap, JEANNINE, SBR , possible closure with bridging biologic mesh on March 2023. The recent CT scan showed abscess within the midline anterior abdomen that communicated with the ventral abdominal wound. Received patient in bed, seems comfortable. Wound VAC on the midline abdomen skin is intact. Patient denies pain. As per patient, she have a follow-up appointment with her surgeon next week. June 13, 2023. Follow-up for wound on the abdomen. As per report, patient was recently admitted in acute care for infection of the wound on the abdomen. As per report, patient completed the antibiotic. Received patient in bed, seems comfortable. Denies pain. Denies fever. I checked the medical record, there is no recent laboratory. 7/11, 2023. Follow-up for wound on the abdomen. As per report, patient have appointment with surgeon today. Current wound treatment on the abdomen is wound VAC and ostomy appliance on the fistula. Patient complaining of pain on the abdomen on palpation. July 18, 2023. Follow-up for wound on the abdomen. Is a patient in bed, seems comfortable. As per report, patient in pain refusing calcium alginate on the right abdomen and was only requesting for a dry sterile dressing. Fistula is still draining a lot. July 25, 2023. Follow-up for wound on the abdomen. As per report, patient was recently sent to emergency room on July 19, 2023 for second opinion. Patient was seen in the ER and was referred to surgeon. Patient have appointment with the general surgeon at [facility].   Received patient in bed, seems comfortable. Denies pain. As per staff member, they still having problem with management of the fistula. Review of Systems   Constitutional: Negative. Respiratory: Negative. Cardiovascular: Negative. Skin: Positive for wound. Psychiatric/Behavioral: Negative. Objective:    Physical Exam  Constitutional:       Appearance: She is obese. Cardiovascular:      Rate and Rhythm: Normal rate. Pulmonary:      Effort: Pulmonary effort is normal.   Musculoskeletal:      Comments: LROM   Skin:     Findings: Lesion present. Comments: Abdomen: Surgical wound is 5 x 15 x 0.1 cm.,  100% granulation, moderate amount of serous drainage, periwound is normal, with no obvious sign of infection    Abdomen left: Wound size is 4.5 x 2.5 x 2.1 cm.,  100% granulation, small amount of serous drainage, with no obvious sign of infection    Patient have for fistula, draining with stool, moderate amount, positive tenderness on palpation, periwound area is excoriated   Neurological:      Mental Status: She is alert. Procedures           Patient's care was coordinated with nursing facility staff. Recent vitals, labs and updated medications were reviewed on EMR or chart system of facility. Past Medical, surgical, social, medication and allergy history and patient's previous records were reviewed and updated as appropriate: Most up-to date information is available in the facility EMR where the patient is currently admitted. Allergy List:  .   Allergy Reaction Adverse Event Date  NKDA Unknown    Past Medical History:  Arthritis  Back pain  Chronic pain  Constipation  Depression  Thyroid disease in the past  Edema  History of hernia repair  Heat stroke  Hyperlipidemia  Hypertension  Kidney stone  Low back pain  Miscarriage  Obesity  Osteoarthritis  Perforated diverticulitis  Small bowel obstruction  Right thumb trigger finger  And unintentional weight loss    Past Surgical History:  Abdominal surgery for diverticulitis with colon resection and colostomy 2005 abscess drainage 2005 appendectomy 2005     Colonoscopy   Colostomy reversal   Dilation and curettage of uterus 1993  Left tibia ORIF 2020  Laparotomy 2023  Laparotomy 2023  Sigmoidectomy   Ventral hernia repair 2011    Social History: Former smoker does not drink alcohol    Family History: Mother with diabetes, miscarriages, thyroid disorder ordered  Father with alcohol abuse and dementia  Maternal grandmother with arthritis and cancer  Sister with seizures    Immunization List:  Immunization ([address] Code) Date Given Consent Status  TB 2 Step Mantoux Skin Test (98) 2023 2:40:00 PM Consented  TB 2 Step Mantoux Skin Test (98) 2023 6:10:00 PM Consented  Influenza (141) 2022 Historical  Tdap (Tetanus, Diphtheria, Pertussis) (115) 2016 Historical       Coordination of Care: Wound team aware of the treatment plan. Facility nurse will provide wound treatment and monitor the wound for any changes. Patient / Staff education : Patient / Staff was given education on sign of infection and pressure ulcer prevention. Patient/ Staff verbalized understanding     I have spent a total time of 25 minutes on 2023 in caring for this patient including Risks and benefits of tx options, Instructions for management, Patient and family education, Importance of tx compliance, Risk factor reductions, Impressions, Counseling / Coordination of care, Documenting in the medical record, Obtaining or reviewing history   and Communicating with other healthcare professionals . Follow up :  Next week    Voice-recognition software may have been used in the preparation of this document. Occasional wrong word or "sound-alike" substitutions may have occurred due to the inherent limitations of voice recognition software. Interpretation should be guided by context.       Maya Ponce Nicci Aguero

## 2023-07-25 NOTE — ASSESSMENT & PLAN NOTE
Abdomen  Wound improved compared to last week, increase granulation and decrease in wound size  Change local wound care to collagen  Patient have appointment with general surgery at [facility]  Follow-up next week

## 2023-08-01 ENCOUNTER — NURSING HOME VISIT (OUTPATIENT)
Dept: WOUND CARE | Facility: HOSPITAL | Age: 63
End: 2023-08-01
Payer: MEDICARE

## 2023-08-01 DIAGNOSIS — Z98.890 HX OF VENTRAL HERNIA REPAIR: Primary | ICD-10-CM

## 2023-08-01 DIAGNOSIS — L98.8 FISTULA: ICD-10-CM

## 2023-08-01 DIAGNOSIS — Z87.19 HX OF VENTRAL HERNIA REPAIR: Primary | ICD-10-CM

## 2023-08-01 PROCEDURE — 99309 SBSQ NF CARE MODERATE MDM 30: CPT | Performed by: NURSE PRACTITIONER

## 2023-08-02 NOTE — ASSESSMENT & PLAN NOTE
Abdomen  Wound significantly improved, decreasing wound size  Continue local wound care calcium alginate  Follow-up in 2 weeks

## 2023-08-02 NOTE — ASSESSMENT & PLAN NOTE
Fistula on the abdomen  3 remaining open fistula, 2 fistula is functioning, brownish drainage  Continue to use ostomy pouch or calcium alginate  Follow-up in 2 weeks

## 2023-08-02 NOTE — PROGRESS NOTES
[facility]       Patient ID: Hiro Corona is a 59 y.o. female Date of Birth 1960     Location of Service: [address] [place], [place] [address]    Performed wound round with: Wound team     Chief Complaint : Right and left abdomen     Wound Instructions:  Wound: Left abdomen  Cleanse with normal saline solution or wound cleanser  Apply Skin-Prep to periwound area  Apply calcium alginate to wound bed and cover with ABD and Kerlix  Daily and as needed for soiling    Wound: Right abdomen including the fistula  Cleanse with normal saline solution wound cleanser  Apply Z guard to periwound area  Apply calcium alginate to wound bed and cover with ABD  Daily and as needed for soiling    Can Apply ostomy appliance to the fistula on the right abdomen for moderate -large drainage    Continue to offload  Monitor for increased drainage and sign of infection, informed primary care in your facility    Allergies  Oxycodone      Assessment & Plan:  1. Hx of ventral hernia repair  Assessment & Plan:  Abdomen  Wound significantly improved, decreasing wound size  Continue local wound care calcium alginate  Follow-up in 2 weeks      2. Fistula  Assessment & Plan:  Fistula on the abdomen  3 remaining open fistula, 2 fistula is functioning, brownish drainage  Continue to use ostomy pouch or calcium alginate  Follow-up in 2 weeks             Subjective:   May 20, 2023. New consult for wound on the abdomen. Patient was referred by Dr. Flory Luis. Patient currently admitted at [address] [place] [address] for short-term rehab. As per review of Dr. Hugh Childress note on May 16, 2023, patient was recently admitted at [facility] for abscess from V1 to May 11, 2023.   Patient have a history of perforated diverticulitis status post Molina's procedure in 2005, colostomy takedown and repair (stomal hernia was done on December 2005, incarcerated virginal hernia with repair with mesh and partial omentectomy on August 2011. She have a recurrent ventral hernia causing partial small bowel obstruction status post ex lap, JEANNINE, SBR , possible closure with bridging biologic mesh on March 2023. The recent CT scan showed abscess within the midline anterior abdomen that communicated with the ventral abdominal wound. Received patient in bed, seems comfortable. Wound VAC on the midline abdomen skin is intact. Patient denies pain. As per patient, she have a follow-up appointment with her surgeon next week. June 13, 2023. Follow-up for wound on the abdomen. As per report, patient was recently admitted in acute care for infection of the wound on the abdomen. As per report, patient completed the antibiotic. Received patient in bed, seems comfortable. Denies pain. Denies fever. I checked the medical record, there is no recent laboratory. 7/11, 2023. Follow-up for wound on the abdomen. As per report, patient have appointment with surgeon today. Current wound treatment on the abdomen is wound VAC and ostomy appliance on the fistula. Patient complaining of pain on the abdomen on palpation. July 18, 2023. Follow-up for wound on the abdomen. Is a patient in bed, seems comfortable. As per report, patient in pain refusing calcium alginate on the right abdomen and was only requesting for a dry sterile dressing. Fistula is still draining a lot. July 25, 2023. Follow-up for wound on the abdomen. As per report, patient was recently sent to emergency room on July 19, 2023 for second opinion. Patient was seen in the ER and was referred to surgeon. Patient have appointment with the general surgeon at [facility]. Received patient in bed, seems comfortable. Denies pain. As per staff member, they still having problem with management of the fistula. August 1, 2023. Follow-up for wound on the abdomen. Received patient in bed, seems comfortable. Denies pain. No significant issues related to the wound.   As per report, the wound drainage is more manageable. Review of Systems   Constitutional: Negative. Respiratory: Negative. Cardiovascular: Negative. Skin: Positive for wound. Psychiatric/Behavioral: Negative. Objective:    Physical Exam  Constitutional:       Appearance: She is obese. Cardiovascular:      Rate and Rhythm: Normal rate. Pulmonary:      Effort: Pulmonary effort is normal.   Musculoskeletal:      Comments: LROM   Skin:     Findings: Lesion present. Comments: Abdomen surgical incision: Wound size is 4x 14 x 0.2 cm.,  100% granulation, small amount of serous drainage, with no obvious sign of infection    Abdomen right: First wound: Wound size is 3.5 x 2 x 0.1 cm.,  100% granulation, small amount of serous drainage, periwound is normal, with no obvious sign of infection    Abdomen (fistula: Wound size is 0.5 x 0.5 cm.,  Functioning fistula, brownish drainage    Abdomen fistula #2: Wound size is 0.5 x 0.5 cm.,  Functioning fistula, brownish drainage    Abdomen fistula #3: Wound size is 0.5 x 0.5 cm.,  No drainage   Neurological:      Mental Status: She is alert. Procedures           Patient's care was coordinated with nursing facility staff. Recent vitals, labs and updated medications were reviewed on EMR or chart system of facility. Past Medical, surgical, social, medication and allergy history and patient's previous records were reviewed and updated as appropriate: Most up-to date information is available in the facility EMR where the patient is currently admitted. Allergy List:  .   Allergy Reaction Adverse Event Date  NKDA Unknown    Past Medical History:  Arthritis  Back pain  Chronic pain  Constipation  Depression  Thyroid disease in the past  Edema  History of hernia repair  Heat stroke  Hyperlipidemia  Hypertension  Kidney stone  Low back pain  Miscarriage  Obesity  Osteoarthritis  Perforated diverticulitis  Small bowel obstruction  Right thumb trigger finger  And unintentional weight loss    Past Surgical History:  Abdominal surgery for diverticulitis with colon resection and colostomy 2005 abscess drainage 2005 appendectomy 2005     Colonoscopy   Colostomy reversal   Dilation and curettage of uterus   Left tibia ORIF 2020  Laparotomy 2023  Laparotomy 2023  Sigmoidectomy   Ventral hernia repair 2011    Social History: Former smoker does not drink alcohol    Family History: Mother with diabetes, miscarriages, thyroid disorder ordered  Father with alcohol abuse and dementia  Maternal grandmother with arthritis and cancer  Sister with seizures    Immunization List:  Immunization ([facility] Code) Date Given Consent Status  TB 2 Step Mantoux Skin Test (98) 2023 2:40:00 PM Consented  TB 2 Step Mantoux Skin Test (98) 2023 6:10:00 PM Consented  Influenza (141) 2022 Historical  Tdap (Tetanus, Diphtheria, Pertussis) (115) 2016 Historical       Coordination of Care: Wound team aware of the treatment plan. Facility nurse will provide wound treatment and monitor the wound for any changes. Patient / Staff education : Patient / Staff was given education on sign of infection and pressure ulcer prevention. Patient/ Staff verbalized understanding     I have spent a total time of 25 minutes on 2023 in caring for this patient including Risks and benefits of tx options, Instructions for management, Patient and family education, Importance of tx compliance, Risk factor reductions, Counseling / Coordination of care and Documenting in the medical record. Follow up : Two weeks    Voice-recognition software may have been used in the preparation of this document. Occasional wrong word or "sound-alike" substitutions may have occurred due to the inherent limitations of voice recognition software. Interpretation should be guided by context.       BAILEE Fortune

## 2023-08-07 ENCOUNTER — OFFICE VISIT (OUTPATIENT)
Dept: WOUND CARE | Facility: HOSPITAL | Age: 63
End: 2023-08-07
Payer: MEDICARE

## 2023-08-07 VITALS
SYSTOLIC BLOOD PRESSURE: 135 MMHG | RESPIRATION RATE: 18 BRPM | TEMPERATURE: 97.3 F | HEART RATE: 111 BPM | DIASTOLIC BLOOD PRESSURE: 66 MMHG

## 2023-08-07 DIAGNOSIS — Z98.890 HX OF VENTRAL HERNIA REPAIR: ICD-10-CM

## 2023-08-07 DIAGNOSIS — Z87.19 HX OF VENTRAL HERNIA REPAIR: ICD-10-CM

## 2023-08-07 DIAGNOSIS — L98.8 FISTULA: Primary | ICD-10-CM

## 2023-08-07 DIAGNOSIS — T14.8XXA SURGICAL WOUND PRESENT: ICD-10-CM

## 2023-08-07 PROCEDURE — 99204 OFFICE O/P NEW MOD 45 MIN: CPT | Performed by: SURGERY

## 2023-08-07 PROCEDURE — 99213 OFFICE O/P EST LOW 20 MIN: CPT | Performed by: SURGERY

## 2023-08-07 RX ORDER — LIDOCAINE 40 MG/G
CREAM TOPICAL ONCE
Status: COMPLETED | OUTPATIENT
Start: 2023-08-07 | End: 2023-08-07

## 2023-08-07 RX ADMIN — LIDOCAINE: 40 CREAM TOPICAL at 13:53

## 2023-08-07 NOTE — PROGRESS NOTES
Patient ID: Erwin Bermudez is a 59 y.o. female Date of Birth 1960       Chief Complaint   Patient presents with   • New Patient Visit     Initial visit for patient who presents with an open wound of the lower mid and  RUQ abdomen. Patient had a bowel obstruction with subsequent surgery. She has draining abd fistulas as well. She is here for a second opinion. Patient currently residing at [facility]. Allergies:  Oxycodone    Diagnosis:  1. Fistula  Assessment & Plan:  There are several fistula on the right side. I told her controlling these in whatever fashion seems to work would be ideal. She has multiple issues with different adhesives. I told her any surgical intervention would be a very large undertaking with multiple risks. She was not interested in that. I told her that with a low output fistula, there is a possibility it would close, but no guarantees could be made    Orders:  -     Wound cleansing and dressings; Future  -     lidocaine (LMX) 4 % cream    2. Surgical wound present  -     Wound cleansing and dressings; Future  -     lidocaine (LMX) 4 % cream    3. Hx of ventral hernia repair  Assessment & Plan:  I reviewed her CT scan and there is a very large defect in the abdominal wall. I told her this would be very difficult to repair and she would not be a candidate at this time with mutiple fistulae present. She understands         Diagnosis ICD-10-CM Associated Orders   1. Fistula  L98.8 Wound cleansing and dressings     lidocaine (LMX) 4 % cream      2. Surgical wound present  T14. 8XXA Wound cleansing and dressings     lidocaine (LMX) 4 % cream      3. Hx of ventral hernia repair  Z98.890     Z87.19              Subjective:   59year old female s/p multiple abdominal operations who has had a wound on the left which previously had a VAC and is now to the point where VAC was discontinued. She had draining fistulae on the right side.   Here to discuss whether she would be a candidate for any srugical intervention    Here for wound follow up  The following portions of the patient's history were reviewed and updated as appropriate:   Patient Active Problem List   Diagnosis   • Hx of ventral hernia repair   • Fistula     Past Medical History:   Diagnosis Date   • Cognitive communication deficit    • Constipation    • Disease of thyroid gland    • Dysphagia    • Essential hypertension    • Fistula of intestine    • GERD (gastroesophageal reflux disease)    • Hyperlipidemia    • Hypothyroidism    • Hypovolemia    • Insomnia    • Obesity    • Osteoarthritis    • Partial obstruction of small intestine ([address])    • Peritoneal abscess ([address])    • Protein calorie malnutrition ([address])    • UTI (urinary tract infection)      Past Surgical History:   Procedure Laterality Date   • ABDOMINAL SURGERY     •  SECTION     • COLOSTOMY     • ORTHOPEDIC SURGERY Left      Social History     Socioeconomic History   • Marital status:      Spouse name: None   • Number of children: None   • Years of education: None   • Highest education level: None   Occupational History   • None   Tobacco Use   • Smoking status: Never   • Smokeless tobacco: Never   Vaping Use   • Vaping Use: Never used   Substance and Sexual Activity   • Alcohol use: Not Currently   • Drug use: Never   • Sexual activity: None   Other Topics Concern   • None   Social History Narrative   • None     Social Determinants of Health     Financial Resource Strain: Not on file   Food Insecurity: Not on file   Transportation Needs: Not on file   Physical Activity: Not on file   Stress: Not on file   Social Connections: Not on file   Intimate Partner Violence: Not on file   Housing Stability: Not on file      No current outpatient medications on file. No current facility-administered medications for this visit.   Family History   Problem Relation Age of Onset   • Diabetes Mother    • Cancer Mother    • Dementia Father    • Seizures Sister       Review of Systems   Constitutional: Positive for activity change and appetite change. Eyes: Negative for visual disturbance. Respiratory: Negative for cough, chest tightness and shortness of breath. Cardiovascular: Negative for chest pain. Gastrointestinal: Positive for constipation and nausea. Genitourinary: Negative for difficulty urinating. Musculoskeletal: Positive for gait problem. Skin: Positive for wound. Allergic/Immunologic: Negative for environmental allergies. Neurological: Positive for seizures and weakness. Psychiatric/Behavioral: Negative for behavioral problems. Objective:  /66   Pulse (!) 111   Temp (!) 97.3 °F (36.3 °C)   Resp 18     Physical Exam  Vitals and nursing note reviewed. Constitutional:       General: She is not in acute distress. Appearance: She is well-developed. She is obese. She is not diaphoretic. HENT:      Head: Normocephalic and atraumatic. Right Ear: External ear normal.      Left Ear: External ear normal.   Eyes:      General: No scleral icterus. Conjunctiva/sclera: Conjunctivae normal.   Neck:      Thyroid: No thyromegaly. Trachea: No tracheal deviation. Cardiovascular:      Rate and Rhythm: Normal rate and regular rhythm. Heart sounds: Normal heart sounds. No murmur heard. No friction rub. Pulmonary:      Effort: Pulmonary effort is normal. No respiratory distress. Breath sounds: Normal breath sounds. No wheezing or rales. Abdominal:      General: There is no distension. Palpations: Abdomen is soft. There is no mass. Tenderness: There is no abdominal tenderness. There is no guarding or rebound. Musculoskeletal:         General: Normal range of motion. Cervical back: Neck supple. Lymphadenopathy:      Cervical: No cervical adenopathy. Skin:     General: Skin is warm and dry. Neurological:      Mental Status: She is alert and oriented to person, place, and time.    Psychiatric: Behavior: Behavior normal.         Thought Content: Thought content normal.         Judgment: Judgment normal.             Wound 08/07/23 Surgical Abdomen Right;Upper (Active)   Wound Image   08/07/23 1316   Wound Description Granulation tissue;Pink;Yellow; Hypergranulation;Epithelialization 08/07/23 1316   Beatriz-wound Assessment Erythema;Edema 08/07/23 1316   Wound Length (cm) 11 cm 08/07/23 1316   Wound Width (cm) 6.4 cm 08/07/23 1316   Wound Depth (cm) 1 cm 08/07/23 1316   Wound Surface Area (cm^2) 70.4 cm^2 08/07/23 1316   Wound Volume (cm^3) 70.4 cm^3 08/07/23 1316   Calculated Wound Volume (cm^3) 70.4 cm^3 08/07/23 1316   Drainage Amount Moderate 08/07/23 1316   Drainage Description Roselie Izzy; Foul smelling; Other (Comment) 08/07/23 1316       Wound 08/07/23 Surgical Abdomen Left; Lower (Active)   Wound Image   08/07/23 1318   Wound Description Hypergranulation;Pink;Granulation tissue; Yellow 08/07/23 1318   Beatriz-wound Assessment Scar Tissue 08/07/23 1318   Wound Length (cm) 5 cm 08/07/23 1318   Wound Width (cm) 11.7 cm 08/07/23 1318   Wound Depth (cm) 0.1 cm 08/07/23 1318   Wound Surface Area (cm^2) 58.5 cm^2 08/07/23 1318   Wound Volume (cm^3) 5.85 cm^3 08/07/23 1318   Calculated Wound Volume (cm^3) 5.85 cm^3 08/07/23 1318   Drainage Amount Moderate 08/07/23 1318   Drainage Description Serosanguineous; Contreras 08/07/23 1318                         Procedures             Wound Instructions:  Orders Placed This Encounter   Procedures   • Wound cleansing and dressings     Wound location: Left and right abdomen     Wash your hands with soap and water. Remove old dressing, discard into plastic bag and place in trash. Cleanse the wound with normal saline prior to applying a clean dressing. Do not use tissue or cotton balls. Do not scrub the wound. Pat dry using gauze. Shower no     Apply calcium alginate to the wounds.     Cover with ABD  Secure with paper tape  Change dressing daily for right abd wound; every shift for the left wound      Increase protein intake to 3-4 servings per day  Increase protein supplements at least 2-3 per day    Continue to follow with [facility] from [address] today; may return as needed    Today's wound treatment note:  Cleansed with normal saline  Redressed as ordered above     Standing Status:   Future     Standing Expiration Date:   8/7/2024         Erick Johnson MD      Portions of the record may have been created with voice recognition software. Occasional wrong word or "sound a like" substitutions may have occurred due to the inherent limitations of voice recognition software. Read the chart carefully and recognize, using context, where substitutions have occurred.

## 2023-08-07 NOTE — ASSESSMENT & PLAN NOTE
I reviewed her CT scan and there is a very large defect in the abdominal wall. I told her this would be very difficult to repair and she would not be a candidate at this time with mutiple fistulae present.   She understands

## 2023-08-07 NOTE — ASSESSMENT & PLAN NOTE
There are several fistula on the right side. I told her controlling these in whatever fashion seems to work would be ideal. She has multiple issues with different adhesives. I told her any surgical intervention would be a very large undertaking with multiple risks. She was not interested in that.   I told her that with a low output fistula, there is a possibility it would close, but no guarantees could be made

## 2023-09-06 ENCOUNTER — TELEPHONE (OUTPATIENT)
Dept: WOUND CARE | Facility: HOSPITAL | Age: 63
End: 2023-09-06

## 2023-09-06 ENCOUNTER — OFFICE VISIT (OUTPATIENT)
Dept: WOUND CARE | Facility: HOSPITAL | Age: 63
End: 2023-09-06
Payer: MEDICARE

## 2023-09-06 VITALS
RESPIRATION RATE: 20 BRPM | DIASTOLIC BLOOD PRESSURE: 62 MMHG | HEART RATE: 101 BPM | SYSTOLIC BLOOD PRESSURE: 120 MMHG | TEMPERATURE: 96.9 F

## 2023-09-06 DIAGNOSIS — L98.8 FISTULA: Primary | ICD-10-CM

## 2023-09-06 DIAGNOSIS — T81.89XA NON-HEALING SURGICAL WOUND, INITIAL ENCOUNTER: ICD-10-CM

## 2023-09-06 PROCEDURE — 99213 OFFICE O/P EST LOW 20 MIN: CPT | Performed by: NURSE PRACTITIONER

## 2023-09-06 RX ORDER — FUROSEMIDE 20 MG/1
20 TABLET ORAL 2 TIMES DAILY
COMMUNITY

## 2023-09-06 RX ORDER — OMEPRAZOLE 20 MG/1
CAPSULE, DELAYED RELEASE ORAL
COMMUNITY
Start: 2023-06-24

## 2023-09-06 RX ORDER — ESCITALOPRAM OXALATE 5 MG/1
1 TABLET ORAL EVERY 24 HOURS
COMMUNITY
Start: 2023-07-06

## 2023-09-06 RX ORDER — LEVOTHYROXINE SODIUM 0.1 MG/1
TABLET ORAL
COMMUNITY
Start: 2023-07-30

## 2023-09-06 RX ORDER — LORATADINE 10 MG/1
1 TABLET ORAL EVERY 24 HOURS
COMMUNITY
Start: 2023-06-25

## 2023-09-06 RX ORDER — LIDOCAINE 50 MG/G
PATCH TOPICAL
COMMUNITY
Start: 2023-08-02

## 2023-09-06 RX ORDER — ASPIRIN 81 MG/1
81 TABLET ORAL DAILY
COMMUNITY

## 2023-09-06 RX ORDER — ACETAMINOPHEN 325 MG/1
2 TABLET ORAL
COMMUNITY
Start: 2023-06-24

## 2023-09-06 NOTE — PATIENT INSTRUCTIONS
Orders Placed This Encounter   Procedures    Wound cleansing and dressings     Right and left abdominal wounds:  Wash your hands with soap and water. Remove old dressing, discard into plastic bag and place in trash. Cleanse the wound with mild soap and water prior to applying a clean dressing. Do not use tissue or cotton balls. Do not scrub the wound. Pat dry using gauze. Shower no  Apply polymem AG to the right and left abdominal wound. Cover with ABDs  Secure with tape  Change dressing daily and as needed. Return in 2 weeks. Dressed as ordered above with polymem at today's visit.      Standing Status:   Future     Standing Expiration Date:   9/6/2024

## 2023-09-06 NOTE — PROGRESS NOTES
Patient ID: Judi Ronquillo is a 59 y.o. female Date of Birth 1960     Chief Complaint  Chief Complaint   Patient presents with   • Follow Up Wound Care Visit     ABD fistula wound       Allergies  Oxycodone    Assessment:     Diagnoses and all orders for this visit:    Fistula  -     Wound cleansing and dressings; Future    Non-healing surgical wound, initial encounter  -     Wound cleansing and dressings; Future    Other orders  -     apixaban (ELIQUIS) 5 mg; Take 5 mg by mouth 2 (two) times a day  -     ascorbic acid (VITAMIN C) 1000 MG tablet; Take 1 tablet by mouth every 24 hours  -     aspirin (ECOTRIN LOW STRENGTH) 81 mg EC tablet; Take 81 mg by mouth daily  -     escitalopram (LEXAPRO) 5 mg tablet; Take 1 tablet by mouth every 24 hours  -     levothyroxine 100 mcg tablet  -     lidocaine (LIDODERM) 5 %  -     loratadine (CLARITIN) 10 mg tablet; Take 1 tablet by mouth every 24 hours  -     omeprazole (PriLOSEC) 20 mg delayed release capsule  -     acetaminophen (TYLENOL) 325 mg tablet; Take 2 tablets by mouth  -     furosemide (LASIX) 20 mg tablet; Take 20 mg by mouth 2 (two) times a day          Plan:  1. F/u visit. Wounds cleansed, saline and gauze. Patient has several enterocutaneous fistulas present on her right side of her abdomen draining a moderate to large amount of drainage. Periwound skin denuded due to drainage. She also has a nonhealing surgical wound on the left side of her abdomen that is clean and granular not showing any signs or symptoms of infection. 2.  We will have both wounds covered with PolyMem Ag change daily and as needed for drainage management. We will also treat her periwound denudement by crusting antifungal powder and skin prep to protect her periwound skin. Dressings are to be covered with ABD pads and secured with tape change daily and as needed  3. Per Dr. Radha Perez note on 8/9, he did not recommend any surgical intervention to fix her enterocutaneous fistulas.   He recommended to continue to manage the drainage from the fistulas instead with conservative wound care. 4.  Patient will follow-up in 2 weeks     Wound 08/07/23 Surgical Abdomen Right;Upper (Active)   Wound Image Images linked 09/06/23 0938   Wound Description Pink; Hypergranulation;Granulation tissue; Epithelialization; Beefy red 09/06/23 0937   Beatriz-wound Assessment Erythema;Scar Tissue;Denuded 09/06/23 0937   Wound Length (cm) 14 cm 09/06/23 0937   Wound Width (cm) 6.5 cm 09/06/23 0937   Wound Surface Area (cm^2) 91 cm^2 09/06/23 0937   Drainage Amount Copious 09/06/23 0937   Drainage Description Yellow;Green;Sanguineous; Bloody 09/06/23 0937   Non-staged Wound Description Full thickness 09/06/23 0937   Treatments Irrigation with NSS 09/06/23 0937       Wound 08/07/23 Surgical Abdomen Left; Lower (Active)   Wound Image Images linked 09/06/23 0938   Wound Description Beefy red;Epithelialization 09/06/23 0941   Beatriz-wound Assessment Intact 09/06/23 0941   Wound Length (cm) 5.7 cm 09/06/23 0941   Wound Width (cm) 14.8 cm 09/06/23 0941   Wound Depth (cm) 0.1 cm 09/06/23 0941   Wound Surface Area (cm^2) 84.36 cm^2 09/06/23 0941   Wound Volume (cm^3) 8.436 cm^3 09/06/23 0941   Calculated Wound Volume (cm^3) 8.44 cm^3 09/06/23 0941   Change in Wound Size % -44.27 09/06/23 0941   Drainage Amount Large 09/06/23 0941   Drainage Description Serosanguineous; Bloody 09/06/23 0941   Non-staged Wound Description Full thickness 09/06/23 0941   Treatments Irrigation with NSS 09/06/23 0941       Wound 08/07/23 Surgical Abdomen Right;Upper (Active)   Date First Assessed: 08/07/23   Primary Wound Type: Surgical  Location: Abdomen  Wound Location Orientation: Right;Upper       Wound 08/07/23 Surgical Abdomen Left; Lower (Active)   Date First Assessed: 08/07/23   Primary Wound Type: Surgical  Location: Abdomen  Wound Location Orientation: Left; Lower       Subjective:      .     Renzo Dewitt returns to the wound center today for her multiple enterocutaneous fistulas on the right side of her abdomen and her nonhealing surgical wound on the left side of her abdomen. Patient was last seen at the wound center on  by Dr. Marco Alarcon. Per Dr. Jennie Givens note he did not recommend any surgical intervention to fix her enterocutaneous fistulas and her ventral hernia. Patient was also hospitalized at [facility] from - acute pulmonary embolism. Patient was started on Eliquis prior to discharge which she is currently taking as prescribed. Patient reports her wounds drain a moderate to large amount of drainage. Nursing staff at her nursing facility were managing her wounds with calcium alginate dressing which she reports was sticking to her wounds and causing her wounds to bleed with dressing changes. She denies any fevers or chills. The following portions of the patient's history were reviewed and updated as appropriate:   She  has a past medical history of Cognitive communication deficit, Constipation, Disease of thyroid gland, Dysphagia, Essential hypertension, Fistula of intestine, GERD (gastroesophageal reflux disease), Hyperlipidemia, Hypothyroidism, Hypovolemia, Insomnia, Obesity, Osteoarthritis, Partial obstruction of small intestine ([address]), Peritoneal abscess ([address]), Protein calorie malnutrition ([address]), and UTI (urinary tract infection). She   Patient Active Problem List    Diagnosis Date Noted   • Fistula 2023   • Hx of ventral hernia repair 2023     She  has a past surgical history that includes Abdominal surgery; Colostomy;  section; and orthopedic surgery (Left). Her family history includes Cancer in her mother; Dementia in her father; Diabetes in her mother; Seizures in her sister. She  reports that she has never smoked. She has never used smokeless tobacco. She reports that she does not currently use alcohol. She reports that she does not use drugs.   Current Outpatient Medications   Medication Sig Dispense Refill   • acetaminophen (TYLENOL) 325 mg tablet Take 2 tablets by mouth     • apixaban (ELIQUIS) 5 mg Take 5 mg by mouth 2 (two) times a day     • ascorbic acid (VITAMIN C) 1000 MG tablet Take 1 tablet by mouth every 24 hours     • aspirin (ECOTRIN LOW STRENGTH) 81 mg EC tablet Take 81 mg by mouth daily     • escitalopram (LEXAPRO) 5 mg tablet Take 1 tablet by mouth every 24 hours     • furosemide (LASIX) 20 mg tablet Take 20 mg by mouth 2 (two) times a day     • levothyroxine 100 mcg tablet      • lidocaine (LIDODERM) 5 %      • loratadine (CLARITIN) 10 mg tablet Take 1 tablet by mouth every 24 hours     • omeprazole (PriLOSEC) 20 mg delayed release capsule        No current facility-administered medications for this visit. She is allergic to oxycodone. .    Review of Systems   Constitutional: Negative. HENT: Negative for ear pain and hearing loss. Eyes: Negative for pain. Respiratory: Negative for chest tightness and shortness of breath. Cardiovascular: Negative for chest pain, palpitations and leg swelling. Gastrointestinal: Negative for diarrhea, nausea and vomiting. Several enterocutaneous fistulas on right abdomen   Genitourinary: Negative for dysuria. Musculoskeletal: Negative for gait problem. Skin: Positive for wound. Neurological: Negative for tremors and weakness. Psychiatric/Behavioral: Negative for behavioral problems, confusion and suicidal ideas. Objective:       Wound 08/07/23 Surgical Abdomen Right;Upper (Active)   Wound Image Images linked 09/06/23 0938   Wound Description Pink; Hypergranulation;Granulation tissue; Epithelialization; Beefy red 09/06/23 0937   Beatriz-wound Assessment Erythema;Scar Tissue;Denuded 09/06/23 0937   Wound Length (cm) 14 cm 09/06/23 0937   Wound Width (cm) 6.5 cm 09/06/23 0937   Wound Surface Area (cm^2) 91 cm^2 09/06/23 0937   Drainage Amount Copious 09/06/23 0937   Drainage Description Yellow;Green;Sanguineous; Bloody 09/06/23 0937 Non-staged Wound Description Full thickness 09/06/23 0937   Treatments Irrigation with NSS 09/06/23 0937       Wound 08/07/23 Surgical Abdomen Left; Lower (Active)   Wound Image Images linked 09/06/23 0938   Wound Description Beefy red;Epithelialization 09/06/23 0941   Beatriz-wound Assessment Intact 09/06/23 0941   Wound Length (cm) 5.7 cm 09/06/23 0941   Wound Width (cm) 14.8 cm 09/06/23 0941   Wound Depth (cm) 0.1 cm 09/06/23 0941   Wound Surface Area (cm^2) 84.36 cm^2 09/06/23 0941   Wound Volume (cm^3) 8.436 cm^3 09/06/23 0941   Calculated Wound Volume (cm^3) 8.44 cm^3 09/06/23 0941   Change in Wound Size % -44.27 09/06/23 0941   Drainage Amount Large 09/06/23 0941   Drainage Description Serosanguineous; Bloody 09/06/23 0941   Non-staged Wound Description Full thickness 09/06/23 0941   Treatments Irrigation with NSS 09/06/23 0941       /62   Pulse 101   Temp (!) 96.9 °F (36.1 °C)   Resp 20     Physical Exam  Vitals and nursing note reviewed. Constitutional:       General: She is not in acute distress. Appearance: Normal appearance. She is obese. HENT:      Head: Normocephalic and atraumatic. Eyes:      General:         Right eye: No discharge. Left eye: No discharge. Pulmonary:      Effort: Pulmonary effort is normal. No respiratory distress. Abdominal:       Musculoskeletal:      Cervical back: Normal range of motion and neck supple. No rigidity. Skin:     General: Skin is warm and dry. Findings: Wound present. No erythema. Neurological:      General: No focal deficit present. Mental Status: She is alert and oriented to person, place, and time. Mental status is at baseline. Psychiatric:         Mood and Affect: Mood normal.         Behavior: Behavior normal.         Thought Content:  Thought content normal.         Judgment: Judgment normal.                        Wound Instructions:  Orders Placed This Encounter   Procedures   • Wound cleansing and dressings Right and left abdominal wounds:  Wash your hands with soap and water. Remove old dressing, discard into plastic bag and place in trash. Cleanse the wound with mild soap and water prior to applying a clean dressing. Do not use tissue or cotton balls. Do not scrub the wound. Pat dry using gauze. Shower no  Apply polymem AG to the right and left abdominal wound. Cover with ABDs  Secure with tape  Change dressing daily and as needed. Return in 2 weeks. Dressed as ordered above with polymem at today's visit. Standing Status:   Future     Standing Expiration Date:   9/6/2024        Diagnosis ICD-10-CM Associated Orders   1. Fistula  L98.8 Wound cleansing and dressings      2.  Non-healing surgical wound, initial encounter  T81.89XA Wound cleansing and dressings

## 2023-09-06 NOTE — TELEPHONE ENCOUNTER
Call from [facility] stating that dressing saturated when patient arrived back to facility. I advised that pt c/o pain with calcium alginate hence the change. Huyen will attempt to order the polymem ag foam as ordered however if unable to obtain will continue to use calcium alginate.

## 2023-09-07 ENCOUNTER — TELEPHONE (OUTPATIENT)
Dept: WOUND CARE | Facility: HOSPITAL | Age: 63
End: 2023-09-07

## 2023-09-07 NOTE — TELEPHONE ENCOUNTER
Bita Beverage from [id number] [facility] called back unable to get Polymem AG will use Calc alginate

## 2023-09-20 ENCOUNTER — OFFICE VISIT (OUTPATIENT)
Dept: WOUND CARE | Facility: HOSPITAL | Age: 63
End: 2023-09-20
Payer: MEDICARE

## 2023-09-20 VITALS — DIASTOLIC BLOOD PRESSURE: 63 MMHG | SYSTOLIC BLOOD PRESSURE: 120 MMHG | TEMPERATURE: 97.7 F | HEART RATE: 94 BPM

## 2023-09-20 DIAGNOSIS — T81.89XD NON-HEALING SURGICAL WOUND, SUBSEQUENT ENCOUNTER: Primary | ICD-10-CM

## 2023-09-20 PROCEDURE — 99213 OFFICE O/P EST LOW 20 MIN: CPT | Performed by: NURSE PRACTITIONER

## 2023-09-20 NOTE — PATIENT INSTRUCTIONS
Orders Placed This Encounter   Procedures    Wound cleansing and dressings     Right and left abdominal wounds:      Wash your hands with soap and water. Remove old dressing, discard into plastic bag and place in trash. Cleanse the wound with mild soap and water prior to applying a clean dressing. Do not use tissue or cotton balls. Do not scrub the wound. Pat dry using gauze. Shower no    Apply calcium alginate to the abdominal wounds. Cover with Optilock or convamax or equivalent superabsorbent dressings and ABD pad over. Secure with tape. Change dressing daily and as needed. Return in 2 weeks. Dressed today with dressings as above.      Standing Status:   Future     Standing Expiration Date:   9/20/2024

## 2023-09-20 NOTE — PROGRESS NOTES
Patient ID: Saida Fu is a 59 y.o. female Date of Birth 1960     Chief Complaint  Chief Complaint   Patient presents with   • Follow Up Wound Care Visit     Abdominal wound and fistula       Allergies  Oxycodone    Assessment:     Diagnoses and all orders for this visit:    Non-healing surgical wound, subsequent encounter  -     Wound cleansing and dressings; Future          Plan:  1. F/u visit. Wound cleansed, saline and gauze. Wound is clean and granular. Continue calcium alginate to wound. We will change secondary dressing to Opticell or ConvaMax or equivalent superabsorbent dressing change daily and as needed for drainage management. 2.  We will have patient's perifistula skin crusted with stoma powder and skin prep to help prevent further excoriation and irritation due to drainage from fistulas. Continue to apply calcium alginate and superabsorbent dressing change daily and as needed  3. Patient will follow-up in 2 weeks. Wound 08/07/23 Surgical Abdomen Right;Upper (Active)   Wound Image Images linked 09/20/23 0959   Wound Description Pink; Hypergranulation;Granulation tissue; Epithelialization; Beefy red 09/20/23 0958   Beatriz-wound Assessment Erythema;Scar Tissue;Denuded 09/20/23 0958   Wound Length (cm) 14.8 cm 09/20/23 0958   Wound Width (cm) 6 cm 09/20/23 0958   Wound Depth (cm) 1.9 cm 09/20/23 0958   Wound Surface Area (cm^2) 88.8 cm^2 09/20/23 0958   Wound Volume (cm^3) 168.72 cm^3 09/20/23 0958   Calculated Wound Volume (cm^3) 168.72 cm^3 09/20/23 0958   Change in Wound Size % -139.66 09/20/23 0958   Drainage Amount Large 09/20/23 0958   Drainage Description Milky; Serosanguineous 09/20/23 0958   Non-staged Wound Description Full thickness 09/20/23 0958   Dressing Status Intact 09/20/23 0958       Wound 08/07/23 Surgical Abdomen Left; Lower (Active)   Wound Image Images linked 09/20/23 1002   Wound Description Beefy red;Epithelialization 09/20/23 1002   Beatriz-wound Assessment Intact 09/20/23 1002   Wound Length (cm) 5.6 cm 09/20/23 1002   Wound Width (cm) 12.2 cm 09/20/23 1002   Wound Depth (cm) 0.1 cm 09/20/23 1002   Wound Surface Area (cm^2) 68.32 cm^2 09/20/23 1002   Wound Volume (cm^3) 6.832 cm^3 09/20/23 1002   Calculated Wound Volume (cm^3) 6.83 cm^3 09/20/23 1002   Change in Wound Size % -16.75 09/20/23 1002   Drainage Amount Moderate 09/20/23 1002   Drainage Description Serosanguineous;Milky 09/20/23 1002   Non-staged Wound Description Full thickness 09/20/23 1002       Wound 08/07/23 Surgical Abdomen Right;Upper (Active)   Date First Assessed: 08/07/23   Primary Wound Type: Surgical  Location: Abdomen  Wound Location Orientation: Right;Upper       Wound 08/07/23 Surgical Abdomen Left; Lower (Active)   Date First Assessed: 08/07/23   Primary Wound Type: Surgical  Location: Abdomen  Wound Location Orientation: Left; Lower       Subjective:      . F/u visit for nonhealing surgical wound of abdomen. Patient reports her SNF was unable to obtain PolyMem Ag as previously recommended at her last wound care appointment. As a result RN staff at SNF have been applying calcium alginate to her wound instead. Patient reports her wound and fistulas have been draining a large amount of drainage. She reports her dressings are being changed multiple times a day, however her dressings continue to leak. Patient is frustrated with quality of care at SNF. She denies any pain, fevers, or chills.       The following portions of the patient's history were reviewed and updated as appropriate:   She  has a past medical history of Cognitive communication deficit, Constipation, Disease of thyroid gland, Dysphagia, Essential hypertension, Fistula of intestine, GERD (gastroesophageal reflux disease), Hyperlipidemia, Hypothyroidism, Hypovolemia, Insomnia, Obesity, Osteoarthritis, Partial obstruction of small intestine ([address]), Peritoneal abscess ([address]), Protein calorie malnutrition ([address]), and UTI (urinary tract infection). She   Patient Active Problem List    Diagnosis Date Noted   • Fistula 2023   • Hx of ventral hernia repair 2023     She  has a past surgical history that includes Abdominal surgery; Colostomy;  section; and orthopedic surgery (Left). Her family history includes Cancer in her mother; Dementia in her father; Diabetes in her mother; Seizures in her sister. She  reports that she has never smoked. She has never used smokeless tobacco. She reports that she does not currently use alcohol. She reports that she does not use drugs. Current Outpatient Medications   Medication Sig Dispense Refill   • acetaminophen (TYLENOL) 325 mg tablet Take 2 tablets by mouth     • apixaban (ELIQUIS) 5 mg Take 5 mg by mouth 2 (two) times a day     • ascorbic acid (VITAMIN C) 1000 MG tablet Take 1 tablet by mouth every 24 hours     • aspirin (ECOTRIN LOW STRENGTH) 81 mg EC tablet Take 81 mg by mouth daily     • escitalopram (LEXAPRO) 5 mg tablet Take 1 tablet by mouth every 24 hours     • furosemide (LASIX) 20 mg tablet Take 20 mg by mouth 2 (two) times a day     • levothyroxine 100 mcg tablet      • lidocaine (LIDODERM) 5 %      • loratadine (CLARITIN) 10 mg tablet Take 1 tablet by mouth every 24 hours     • omeprazole (PriLOSEC) 20 mg delayed release capsule        No current facility-administered medications for this visit. She is allergic to oxycodone. .    Review of Systems   Constitutional: Negative. HENT: Negative for ear pain and hearing loss. Eyes: Negative for pain. Respiratory: Negative for chest tightness and shortness of breath. Cardiovascular: Negative for chest pain, palpitations and leg swelling. Gastrointestinal: Negative for diarrhea, nausea and vomiting. Genitourinary: Negative for dysuria. Musculoskeletal: Positive for gait problem. Skin: Positive for wound. Neurological: Negative for tremors and weakness.    Psychiatric/Behavioral: Negative for behavioral problems, confusion and suicidal ideas. Objective:       Wound 08/07/23 Surgical Abdomen Right;Upper (Active)   Wound Image Images linked 09/20/23 0959   Wound Description Pink; Hypergranulation;Granulation tissue; Epithelialization; Beefy red 09/20/23 0958   Beatriz-wound Assessment Erythema;Scar Tissue;Denuded 09/20/23 0958   Wound Length (cm) 14.8 cm 09/20/23 0958   Wound Width (cm) 6 cm 09/20/23 0958   Wound Depth (cm) 1.9 cm 09/20/23 0958   Wound Surface Area (cm^2) 88.8 cm^2 09/20/23 0958   Wound Volume (cm^3) 168.72 cm^3 09/20/23 0958   Calculated Wound Volume (cm^3) 168.72 cm^3 09/20/23 0958   Change in Wound Size % -139.66 09/20/23 0958   Drainage Amount Large 09/20/23 0958   Drainage Description Milky; Serosanguineous 09/20/23 0958   Non-staged Wound Description Full thickness 09/20/23 0958   Dressing Status Intact 09/20/23 0958       Wound 08/07/23 Surgical Abdomen Left; Lower (Active)   Wound Image Images linked 09/20/23 1002   Wound Description Beefy red;Epithelialization 09/20/23 1002   Beatriz-wound Assessment Intact 09/20/23 1002   Wound Length (cm) 5.6 cm 09/20/23 1002   Wound Width (cm) 12.2 cm 09/20/23 1002   Wound Depth (cm) 0.1 cm 09/20/23 1002   Wound Surface Area (cm^2) 68.32 cm^2 09/20/23 1002   Wound Volume (cm^3) 6.832 cm^3 09/20/23 1002   Calculated Wound Volume (cm^3) 6.83 cm^3 09/20/23 1002   Change in Wound Size % -16.75 09/20/23 1002   Drainage Amount Moderate 09/20/23 1002   Drainage Description Serosanguineous;Milky 09/20/23 1002   Non-staged Wound Description Full thickness 09/20/23 1002       /63   Pulse 94   Temp 97.7 °F (36.5 °C)     Physical Exam  Vitals and nursing note reviewed. Constitutional:       General: She is not in acute distress. Appearance: Normal appearance. She is obese. HENT:      Head: Normocephalic and atraumatic. Eyes:      General:         Right eye: No discharge. Left eye: No discharge.    Pulmonary:      Effort: Pulmonary effort is normal. No respiratory distress. Abdominal:      Hernia: A hernia is present. Musculoskeletal:      Cervical back: Normal range of motion. No rigidity. Comments: Patient nonambulatory. Arrived to wound care appointment on stretcher   Skin:     General: Skin is warm and dry. Findings: Wound present. No erythema. Neurological:      General: No focal deficit present. Mental Status: She is alert and oriented to person, place, and time. Mental status is at baseline. Psychiatric:         Mood and Affect: Mood normal.         Behavior: Behavior normal.         Thought Content: Thought content normal.         Judgment: Judgment normal.                       Wound Instructions:  Orders Placed This Encounter   Procedures   • Wound cleansing and dressings     Right and left abdominal wounds:      Wash your hands with soap and water. Remove old dressing, discard into plastic bag and place in trash. Cleanse the wound with mild soap and water prior to applying a clean dressing. Do not use tissue or cotton balls. Do not scrub the wound. Pat dry using gauze. Shower no    Apply calcium alginate to the abdominal wounds. Cover with Optilock or convamax or equivalent superabsorbent dressings and ABD pad over. Secure with tape. Change dressing daily and as needed.     Return in 2 weeks.     Dressed today with dressings as above. Standing Status:   Future     Standing Expiration Date:   9/20/2024        Diagnosis ICD-10-CM Associated Orders   1.  Non-healing surgical wound, subsequent encounter  T81.89XD Wound cleansing and dressings

## 2025-06-26 ENCOUNTER — APPOINTMENT (EMERGENCY)
Dept: RADIOLOGY | Facility: HOSPITAL | Age: 65
End: 2025-06-26
Payer: MEDICARE

## 2025-06-26 ENCOUNTER — HOSPITAL ENCOUNTER (EMERGENCY)
Facility: HOSPITAL | Age: 65
Discharge: HOME | End: 2025-06-26
Attending: SURGERY | Admitting: SURGERY
Payer: MEDICARE

## 2025-06-26 VITALS
RESPIRATION RATE: 19 BRPM | HEART RATE: 89 BPM | BODY MASS INDEX: 46.57 KG/M2 | TEMPERATURE: 98.1 F | SYSTOLIC BLOOD PRESSURE: 133 MMHG | OXYGEN SATURATION: 94 % | DIASTOLIC BLOOD PRESSURE: 86 MMHG | WEIGHT: 275.57 LBS
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
## 2025-06-26 DIAGNOSIS — S02.2XXA NASAL FRACTURE: ICD-10-CM

## 2025-06-26 LAB
ABO GROUP BLD: NORMAL
ALBUMIN SERPL BCG-MCNC: 3.9 G/DL (ref 3.5–5)
ALP SERPL-CCNC: 81 U/L (ref 34–104)
ALT SERPL W P-5'-P-CCNC: 13 U/L (ref 7–52)
ANION GAP SERPL CALCULATED.3IONS-SCNC: 8 MMOL/L (ref 4–13)
APTT PPP: 28 SECONDS (ref 23–34)
AST SERPL W P-5'-P-CCNC: 38 U/L (ref 13–39)
BASOPHILS # BLD AUTO: 0.03 THOUSANDS/ÂΜL (ref 0–0.1)
BASOPHILS NFR BLD AUTO: 0 % (ref 0–1)
BILIRUB SERPL-MCNC: 0.54 MG/DL (ref 0.2–1)
BLD GP AB SCN SERPL QL: NEGATIVE
BUN SERPL-MCNC: 14 MG/DL (ref 5–25)
CALCIUM SERPL-MCNC: 9.3 MG/DL (ref 8.4–10.2)
CHLORIDE SERPL-SCNC: 105 MMOL/L (ref 96–108)
CO2 SERPL-SCNC: 22 MMOL/L (ref 21–32)
CREAT SERPL-MCNC: 0.63 MG/DL (ref 0.6–1.3)
EOSINOPHIL # BLD AUTO: 0.01 THOUSAND/ÂΜL (ref 0–0.61)
EOSINOPHIL NFR BLD AUTO: 0 % (ref 0–6)
ERYTHROCYTE [DISTWIDTH] IN BLOOD BY AUTOMATED COUNT: 15.6 % (ref 11.6–15.1)
GFR SERPL CREATININE-BSD FRML MDRD: 95 ML/MIN/1.73SQ M
GLUCOSE SERPL-MCNC: 132 MG/DL (ref 65–140)
HCT VFR BLD AUTO: 41.2 % (ref 34.8–46.1)
HGB BLD-MCNC: 12.5 G/DL (ref 11.5–15.4)
IMM GRANULOCYTES # BLD AUTO: 0.09 THOUSAND/UL (ref 0–0.2)
IMM GRANULOCYTES NFR BLD AUTO: 1 % (ref 0–2)
INR PPP: 0.95 (ref 0.85–1.19)
LYMPHOCYTES # BLD AUTO: 1.9 THOUSANDS/ÂΜL (ref 0.6–4.47)
LYMPHOCYTES NFR BLD AUTO: 12 % (ref 14–44)
MCH RBC QN AUTO: 26.8 PG (ref 26.8–34.3)
MCHC RBC AUTO-ENTMCNC: 30.3 G/DL (ref 31.4–37.4)
MCV RBC AUTO: 88 FL (ref 82–98)
MONOCYTES # BLD AUTO: 0.83 THOUSAND/ÂΜL (ref 0.17–1.22)
MONOCYTES NFR BLD AUTO: 5 % (ref 4–12)
NEUTROPHILS # BLD AUTO: 12.82 THOUSANDS/ÂΜL (ref 1.85–7.62)
NEUTS SEG NFR BLD AUTO: 82 % (ref 43–75)
NRBC BLD AUTO-RTO: 0 /100 WBCS
PLATELET # BLD AUTO: 358 THOUSANDS/UL (ref 149–390)
PMV BLD AUTO: 9.8 FL (ref 8.9–12.7)
POTASSIUM SERPL-SCNC: 4.9 MMOL/L (ref 3.5–5.3)
PROT SERPL-MCNC: 7 G/DL (ref 6.4–8.4)
PROTHROMBIN TIME: 13 SECONDS (ref 12.3–15)
RBC # BLD AUTO: 4.66 MILLION/UL (ref 3.81–5.12)
RH BLD: POSITIVE
SODIUM SERPL-SCNC: 135 MMOL/L (ref 135–147)
SPECIMEN EXPIRATION DATE: NORMAL
WBC # BLD AUTO: 15.68 THOUSAND/UL (ref 4.31–10.16)

## 2025-06-26 PROCEDURE — 85610 PROTHROMBIN TIME: CPT

## 2025-06-26 PROCEDURE — 36415 COLL VENOUS BLD VENIPUNCTURE: CPT

## 2025-06-26 PROCEDURE — 71045 X-RAY EXAM CHEST 1 VIEW: CPT

## 2025-06-26 PROCEDURE — 85730 THROMBOPLASTIN TIME PARTIAL: CPT

## 2025-06-26 PROCEDURE — 73560 X-RAY EXAM OF KNEE 1 OR 2: CPT

## 2025-06-26 PROCEDURE — 73502 X-RAY EXAM HIP UNI 2-3 VIEWS: CPT

## 2025-06-26 PROCEDURE — 99204 OFFICE O/P NEW MOD 45 MIN: CPT | Performed by: SURGERY

## 2025-06-26 PROCEDURE — 80053 COMPREHEN METABOLIC PANEL: CPT

## 2025-06-26 PROCEDURE — 86900 BLOOD TYPING SEROLOGIC ABO: CPT | Performed by: SURGERY

## 2025-06-26 PROCEDURE — 72125 CT NECK SPINE W/O DYE: CPT

## 2025-06-26 PROCEDURE — 86901 BLOOD TYPING SEROLOGIC RH(D): CPT | Performed by: SURGERY

## 2025-06-26 PROCEDURE — NC001 PR NO CHARGE: Performed by: EMERGENCY MEDICINE

## 2025-06-26 PROCEDURE — 85025 COMPLETE CBC W/AUTO DIFF WBC: CPT

## 2025-06-26 PROCEDURE — 86850 RBC ANTIBODY SCREEN: CPT | Performed by: SURGERY

## 2025-06-26 PROCEDURE — 70450 CT HEAD/BRAIN W/O DYE: CPT

## 2025-06-26 RX ORDER — ACETAMINOPHEN 325 MG/1
975 TABLET ORAL ONCE
Status: COMPLETED | OUTPATIENT
Start: 2025-06-26 | End: 2025-06-26

## 2025-06-26 RX ADMIN — ACETAMINOPHEN 975 MG: 325 TABLET, FILM COATED ORAL at 22:01

## 2025-06-27 NOTE — DISCHARGE INSTRUCTIONS
Your findings today were significant for a nondisplaced nasal bone fracture and chronic osteoarthritis in your joints. Please follow up with ent as needed for the fracture. Come back to the hospital if you have any new concerning symptoms.

## 2025-06-27 NOTE — H&P
H&P - Trauma   Name: Mechelle Bolton 65 y.o. female I MRN: [id number]  Unit/Bed#: QCB I Date of Admission: 6/26/2025   Date of Service: 6/26/2025 I Hospital Day: 0     Assessment & Plan  Fall, initial encounter  HS on Eliquis  -LOC  Ambulatory   Plan   CT head:No acute intracranial abnormality. Acute nasal tip fracture with overlying soft tissue swelling.   CT C spine: no acute alignment   Xray Left Pelvis: Chronic osteoarthritis   Xray Right knee: chronic osteoarthritis   DC with pcp followup   Strict return precautions.    Trauma Alert: Level B   Model of Arrival: Self    Trauma Team: Attending Guzman Escobar and Residents Brian Hall  Consultants:     None     History of Present Illness   Chief Complaint: Fall Hs on eliquis  Mechanism:Fall     Mechelle Bolton is a 65 y.o. female who presents after an ambulatory fall on eliquis at 3pm. The patient states she hit her head. She did not lose consciousness. She has complaint of mild headache, Left hip pain, right knee pain and a small laceration over her nasal bridge. She denies N/V/ or vision changes. .    Review of Systems   Constitutional:  Negative for chills and fever.   HENT:  Negative for ear pain and sore throat.    Eyes:  Negative for pain and visual disturbance.   Respiratory:  Negative for cough and shortness of breath.    Cardiovascular:  Negative for chest pain and palpitations.   Gastrointestinal:  Negative for abdominal pain and vomiting.   Genitourinary:  Negative for dysuria and hematuria.   Musculoskeletal:  Positive for arthralgias and myalgias. Negative for back pain.   Skin:  Negative for color change and rash.   Neurological:  Negative for seizures and syncope.   All other systems reviewed and are negative.    Medical History Review: I have reviewed the patient's PMH, PSH, Social History, Family History, Meds, and Allergies   Immunization History   Administered Date(s) Administered    COVID-19 PFIZER VACCINE 0.3 ML IM 03/11/2021, 04/01/2021,  01/13/2022    Tdap 06/06/2016    Tuberculin Skin Test-PPD Intradermal 04/12/2016, 04/26/2016     Last Tetanus: 6/6/16  Objective :  Temp:  [98.1 °F (36.7 °C)] 98.1 °F (36.7 °C)  HR:  [81-90] 84  BP: (105-149)/(67-82) 105/69  Resp:  [18-20] 18  SpO2:  [95 %-98 %] 98 %  O2 Device: None (Room air)    Initial Vitals:   Temperature: 98.1 °F (36.7 °C) (06/26/25 2050)  Pulse: 90 (06/26/25 2050)  Respirations: 18 (06/26/25 2050)  Blood Pressure: 135/82 (06/26/25 2050)    Primary Survey:   Airway:        Status: patent;        Pre-hospital Interventions: none        Hospital Interventions: none  Breathing:        Pre-hospital Interventions: none       Effort: normal       Right breath sounds: normal       Left breath sounds: normal  Circulation:        Rhythm:        Rate: regular   Right Pulses Left Pulses    R radial: 2+  R femoral: 2+  R pedal: 2+     L radial: 2+  L femoral: 2+  L pedal: 2+       Disability:        GCS: Eye: 4; Verbal: 5 Motor: 6 Total: 15       Right Pupil: round;  reactive         Left Pupil:  round;  reactive      R Motor Strength L Motor Strength    R : 5/5  R dorsiflex: 5/5  R plantarflex: 5/5 L : 5/5  L dorsiflex: 5/5  L plantarflex: 5/5        Sensory:  No sensory deficit  Exposure:           Secondary Survey:  Physical Exam  Constitutional:       General: She is not in acute distress.  HENT:      Head:       Eyes:      Extraocular Movements: Extraocular movements intact.      Pupils: Pupils are equal, round, and reactive to light.       Cardiovascular:      Rate and Rhythm: Normal rate.      Heart sounds: No murmur heard.  Pulmonary:      Effort: Pulmonary effort is normal. No respiratory distress.      Breath sounds: No wheezing or rales.   Abdominal:      General: Abdomen is flat. There is distension.      Comments: Surgical scarrs present. nontender     Musculoskeletal:      Cervical back: No tenderness.        Legs:      Neurological:      Mental Status: She is oriented to person,  place, and time.      Cranial Nerves: No cranial nerve deficit.      Motor: No weakness.      Gait: Gait normal.           Lab Results: I have reviewed the following results:  Recent Labs     06/26/25 2057   WBC 15.68*   HGB 12.5   HCT 41.2      SODIUM 135   K 4.9      CO2 22   BUN 14   CREATININE 0.63   GLUC 132   AST 38   ALT 13   ALB 3.9   TBILI 0.54   ALKPHOS 81       Imaging Results: I have personally reviewed pertinent images saved in PACS. CT scan findings (and other pertinent positive findings on images) were discussed with radiology. My interpretation of the images/reports are as follows:  XR Trauma multiple ([facility] trauma bay ONLY)    (Results Pending)   TRAUMA - CT head wo contrast    (Results Pending)   TRAUMA - CT spine cervical wo contrast    (Results Pending)   XR hip/pelv 2-3 vws left if performed    (Results Pending)   XR chest 1 view    (Results Pending)   XR knee 1 or 2 vw right    (Results Pending)       Other Studies:

## 2025-06-27 NOTE — ED PROVIDER NOTES
Emergency Department Airway Evaluation and Management Form    History  Obtained from: pt  Review of patient's allergies indicates no known allergies.    Chief Complaint:  Trauma Alert    HPI: Pt is a 65 y.o. female presents s/p fall, hit head, and face, trip and fall. Pt on eliquis. No loc. Pt with nasal bone pain      I have reviewed and agree with the history as documented.      Physical Exam    There were no vitals filed for this visit.  Supplemental Oxygen:none    GCS: 15    Neuro: Alert and oriented  Psych: not combative, not anxious, cooperative for exam  Neck: In collar, No JVD, No midline tenderness  Cardio:  Normal  Respiratory: Normal  Mouth:  Normal  Pharynx: Normal    Monitor:  NSR      ED Medications    Current Medications[1]      Intubation    No intubation required    Final Diagnosis:  Closed head injury, nasal bone contusion    ED Provider  Electronically Signed by         [1] No current facility-administered medications for this encounter.    Current Outpatient Medications:     acetaminophen (TYLENOL) 325 mg tablet, Take 2 tablets by mouth, Disp: , Rfl:     apixaban (ELIQUIS) 5 mg, Take 5 mg by mouth 2 (two) times a day, Disp: , Rfl:     ascorbic acid (VITAMIN C) 1000 MG tablet, Take 1 tablet by mouth every 24 hours, Disp: , Rfl:     aspirin (ECOTRIN LOW STRENGTH) 81 mg EC tablet, Take 81 mg by mouth daily, Disp: , Rfl:     escitalopram (LEXAPRO) 5 mg tablet, Take 1 tablet by mouth every 24 hours, Disp: , Rfl:     furosemide (LASIX) 20 mg tablet, Take 20 mg by mouth 2 (two) times a day, Disp: , Rfl:     levothyroxine 100 mcg tablet, , Disp: , Rfl:     lidocaine (LIDODERM) 5 %, , Disp: , Rfl:     loratadine (CLARITIN) 10 mg tablet, Take 1 tablet by mouth every 24 hours, Disp: , Rfl:     omeprazole (PriLOSEC) 20 mg delayed release capsule, , Disp: , Rfl:        Rena Trevino MD  06/26/25 2054